# Patient Record
Sex: MALE | Race: WHITE | Employment: OTHER | URBAN - METROPOLITAN AREA
[De-identification: names, ages, dates, MRNs, and addresses within clinical notes are randomized per-mention and may not be internally consistent; named-entity substitution may affect disease eponyms.]

---

## 2019-04-23 ENCOUNTER — PRE VISIT (OUTPATIENT)
Dept: UROLOGY | Facility: CLINIC | Age: 69
End: 2019-04-23

## 2019-04-23 DIAGNOSIS — Q62.39 CONGENITAL OBSTRUCTION OF URETEROPELVIC JUNCTION: Primary | ICD-10-CM

## 2019-04-23 NOTE — TELEPHONE ENCOUNTER
MEDICAL RECORDS REQUEST   [facility]  [address] [place] [address]  PHONE: [phone]  Fax: [phone]        FUTURE VISIT INFORMATION                                                   Mitch Orozco, : 1950 scheduled for future visit at [facility]    APPOINTMENT INFORMATION:    Date: 19 7:00AM    Provider:  Ronnie Drake MD     Reason for Visit/Diagnosis: Urinary Ureteral Obstruction    REFERRAL INFORMATION:    Referring provider:  Self    Specialty: N/A    Referring providers clinic:  N/A    Clinic contact number:  N/A    RECORDS REQUESTED FOR VISIT                                                     NOTES  STATUS/DETAILS   OFFICE NOTE from referring provider  no   OFFICE NOTE from other specialist  no   DISCHARGE SUMMARY from hospital  no   DISCHARGE REPORT from the ER  no   OPERATIVE REPORT  no   MEDICATION LIST  no       PRE-VISIT CHECKLIST      Record collection complete If no, please explain: Recs are in [place], not able to obtain. Spoke with Nurse, images and labs are ordered prior to appt.    Appointment appropriately scheduled           (right time/right provider) No   MyChart activation No   Questionnaire complete If no, please explain: IN PROCESS     Completed by: Bella Wen

## 2019-04-30 ENCOUNTER — DOCUMENTATION ONLY (OUTPATIENT)
Dept: CARE COORDINATION | Facility: CLINIC | Age: 69
End: 2019-04-30

## 2019-05-03 ENCOUNTER — OFFICE VISIT (OUTPATIENT)
Dept: FAMILY MEDICINE | Facility: CLINIC | Age: 69
End: 2019-05-03
Payer: COMMERCIAL

## 2019-05-03 VITALS
DIASTOLIC BLOOD PRESSURE: 82 MMHG | HEART RATE: 73 BPM | SYSTOLIC BLOOD PRESSURE: 158 MMHG | HEIGHT: 70 IN | OXYGEN SATURATION: 99 % | WEIGHT: 153 LBS | BODY MASS INDEX: 21.9 KG/M2

## 2019-05-03 DIAGNOSIS — N40.1 BENIGN PROSTATIC HYPERPLASIA WITH LOWER URINARY TRACT SYMPTOMS, SYMPTOM DETAILS UNSPECIFIED: Primary | ICD-10-CM

## 2019-05-03 DIAGNOSIS — F32.A DEPRESSION, UNSPECIFIED DEPRESSION TYPE: ICD-10-CM

## 2019-05-03 RX ORDER — FINASTERIDE 5 MG/1
5 TABLET, FILM COATED ORAL DAILY
Qty: 30 TABLET | Refills: 1 | Status: SHIPPED | OUTPATIENT
Start: 2019-05-03 | End: 2020-03-12

## 2019-05-03 RX ORDER — PAROXETINE HYDROCHLORIDE HEMIHYDRATE 25 MG/1
25 TABLET, FILM COATED, EXTENDED RELEASE ORAL EVERY MORNING
COMMUNITY
End: 2019-05-03

## 2019-05-03 RX ORDER — DOXAZOSIN 8 MG/1
8 TABLET ORAL AT BEDTIME
Qty: 30 TABLET | Refills: 3 | Status: SHIPPED | OUTPATIENT
Start: 2019-05-03 | End: 2019-08-27

## 2019-05-03 RX ORDER — LIDOCAINE HYDROCHLORIDE 20 MG/ML
JELLY TOPICAL PRN
Qty: 30 ML | Refills: 0 | Status: SHIPPED | OUTPATIENT
Start: 2019-05-03 | End: 2019-05-29

## 2019-05-03 RX ORDER — PAROXETINE HYDROCHLORIDE HEMIHYDRATE 25 MG/1
25 TABLET, FILM COATED, EXTENDED RELEASE ORAL EVERY MORNING
Qty: 30 TABLET | Refills: 1 | Status: SHIPPED | OUTPATIENT
Start: 2019-05-03 | End: 2019-05-31

## 2019-05-03 RX ORDER — DUTASTERIDE 0.5 MG/1
0.5 CAPSULE, LIQUID FILLED ORAL DAILY
COMMUNITY
End: 2019-05-03

## 2019-05-03 RX ORDER — DOXAZOSIN 8 MG/1
8 TABLET ORAL AT BEDTIME
COMMUNITY
End: 2019-05-03

## 2019-05-03 ASSESSMENT — ENCOUNTER SYMPTOMS
SLEEP DISTURBANCE: 0
DIZZINESS: 0
FEVER: 0
ABDOMINAL PAIN: 0
FATIGUE: 0
DYSPHORIC MOOD: 0
WEAKNESS: 0
DYSURIA: 0
DIFFICULTY URINATING: 1

## 2019-05-03 ASSESSMENT — MIFFLIN-ST. JEOR: SCORE: 1469.63

## 2019-05-03 NOTE — PROGRESS NOTES
Preceptor Attestation:   Patient seen, evaluated and discussed with the resident. I have verified the content of the note, which accurately reflects my assessment of the patient and the plan of care.   Supervising Physician:  Tiago Mancilla MD

## 2019-05-03 NOTE — PROGRESS NOTES
HPI       Mitch Orozco is a 68 year old  who presents for   Chief Complaint   Patient presents with     Urinary Problem     needs catheters     Patient is here to establish care. Patient has BPH, and has been self-cathing since February. Has had BPH for 20 years. Has been living in [place] for past 10 years. Was diagnosed with hydronephrosis from obstructive causes.  Patient has appointment with urology at the end of the month but is been around catheters before that.  Patient has been on dutasteride the past few months but he would like to switch back to finasteride which is what he was on prior to going to [facility].  Patient is also on doxazosin for his BPH.  Patient has been considering getting a TURP procedure and is weighing the risks and benefits with his wife and brother who is a family medicine physician    He also has history of depression, doing well on Paxil.     Patient reports that he is feeling well today.  Denies any chest pain or shortness of breath.  Patient denies any dysuria.  Patient is catheterizing himself 5 times per day.  Patient reports that sometimes he does get some discolored at the tip of his penis and that Xylocaine gel is helpful in relieving the discomfort.         +++++++      Problem, Medication and Allergy Lists were reviewed and updated if needed..    Patient is an established patient of this clinic..         Review of Systems:   Review of Systems   Constitutional: Negative for fatigue and fever.   HENT: Negative for congestion.    Cardiovascular: Negative for chest pain.   Gastrointestinal: Negative for abdominal pain.   Endocrine: Negative for polyuria.   Genitourinary: Positive for difficulty urinating and penile pain. Negative for dysuria and penile swelling.   Allergic/Immunologic: Negative for immunocompromised state.   Neurological: Negative for dizziness, syncope and weakness.   Psychiatric/Behavioral: Negative for dysphoric mood and sleep disturbance.   All other  "systems reviewed and are negative.           Physical Exam:     Vitals:    05/03/19 0859   BP: 151/71   Pulse: 73   SpO2: 99%   Weight: 69.4 kg (153 lb)   Height: 1.785 m (5' 10.28\")     Body mass index is 21.78 kg/m .  Vitals were reviewed and were normal     Physical Exam  General- No acute distress, well-appearing  Skin- warm, no obvious skin lesions  Neuro- AOX3, CN 2-12 grossly intact  Cardio- RRR, no murmurs  Pulmonary- CTA in all fields, no wheezes or rhales  Psych- appropriate mood and affect    Results:   No testing ordered today    Assessment and Plan        Mitch was seen today for urinary problem.    Diagnoses and all orders for this visit:    Patient is here to establish care.  As severe BPH and is currently following up with urology at the end of the month but was in need of refills for his catheters and medications.  We discussed patient's health maintenance and I have updated his health maintenance section his medical record.      Benign prostatic hyperplasia with lower urinary tract symptoms, symptom details unspecified  -     lidocaine (XYLOCAINE) 2 % external gel; Place into the urethra as needed for moderate pain  -     doxazosin (CARDURA) 8 MG tablet; Take 1 tablet (8 mg) by mouth At Bedtime  -     finasteride (PROSCAR) 5 MG tablet; Take 1 tablet (5 mg) by mouth daily  -     order for DME; Equipment being ordered:     Straight Urinary Catheter, any brand or type OK    Diagnosis    Depression, unspecified depression type  -     PARoxetine (PAXIL-CR) 25 MG 24 hr tablet; Take 1 tablet (25 mg) by mouth every morning           There are no discontinued medications.    Options for treatment and follow-up care were reviewed with the patient. Mitch Orozco  engaged in the decision making process and verbalized understanding of the options discussed and agreed with the final plan.    Javier Burr, DO  "

## 2019-05-13 ENCOUNTER — TELEPHONE (OUTPATIENT)
Dept: FAMILY MEDICINE | Facility: CLINIC | Age: 69
End: 2019-05-13

## 2019-05-13 DIAGNOSIS — F32.A DEPRESSION, UNSPECIFIED DEPRESSION TYPE: Primary | ICD-10-CM

## 2019-05-13 NOTE — TELEPHONE ENCOUNTER
[facility] phone call message- medication clarification/question:    Full Medication Name: PARoxetine (PAXIL-CR) 25 MG 24 hr tablet   Dose: Take 1 tablet (25 mg) by mouth every morning - Oral    Question/Clarification needed: Patient calling to advise that paroxetine ER not covered by insurance; inquiring if provider could prescribe generic instead. Patient would prefer generic over PA on name brand.     Pharmacy confirmed as     [facility] [id number] [place] [address] & [address] [address]  Phone: [phone] Fax: [phone]  : Yes    Please leave ONLY preferred pharmacy    OK to leave a message on voice mail? Yes    Advised patient that RN would call back within 3 hours, unless emergent.    Primary language: English      needed? No    Call taken on May 13, 2019 at 1:13 PM by Bella Ledesma    Route to [facility]

## 2019-05-16 ENCOUNTER — DOCUMENTATION ONLY (OUTPATIENT)
Dept: FAMILY MEDICINE | Facility: CLINIC | Age: 69
End: 2019-05-16

## 2019-05-16 NOTE — PROGRESS NOTES
"When opening a documentation only encounter, be sure to enter in \"Chief Complaint\" Forms and in \" Comments\" Title of form, description if needed.    Mitch is a 68 year old  male  Form received via: Fax  Form now resides in: PCS Pending      Form has been completed by provider.     Form sent out via: Fax to Handi at Fax Number: [phone]  Patient informed: na  Output date: May 16, 2019    Nemo Knowles CMA      **Please close the encounter**      "

## 2019-05-17 NOTE — TELEPHONE ENCOUNTER
"Patient came to clinic to discuss about  medication \"Paxil ER\" per pharmacist \"there is no generic in the market and patient paid increased copay, out of pocket, patient called and then here today to discuss alternative affordable form of the medication, message routed to PCP to advise if appropriative.    Leslee Payne RN    "

## 2019-05-20 ENCOUNTER — DOCUMENTATION ONLY (OUTPATIENT)
Dept: FAMILY MEDICINE | Facility: CLINIC | Age: 69
End: 2019-05-20

## 2019-05-20 NOTE — PROGRESS NOTES
"When opening a documentation only encounter, be sure to enter in \"Chief Complaint\" Forms and in \" Comments\" Title of form, description if needed.    Mitch is a 68 year old  male  Form received via: Fax  Form now resides in: Provider Ready    Nemo Knowles CMA     Form has been completed by provider.     Form sent out via: Fax to Handi at Fax Number: [phone]  Patient informed: na  Output date: May 23, 2019    Nemo Knowles CMA      **Please close the encounter**                      "

## 2019-05-22 ENCOUNTER — MEDICAL CORRESPONDENCE (OUTPATIENT)
Dept: HEALTH INFORMATION MANAGEMENT | Facility: CLINIC | Age: 69
End: 2019-05-22

## 2019-05-22 ENCOUNTER — PRE VISIT (OUTPATIENT)
Dept: UROLOGY | Facility: CLINIC | Age: 69
End: 2019-05-22

## 2019-05-22 RX ORDER — PAROXETINE 30 MG/1
30 TABLET, FILM COATED ORAL EVERY MORNING
Qty: 30 TABLET | Refills: 3 | Status: SHIPPED | OUTPATIENT
Start: 2019-05-22 | End: 2019-06-25

## 2019-05-22 NOTE — TELEPHONE ENCOUNTER
RN contacted patient and he is happy to use the short acting. He states he does not need a return call with the new dosage, he trusts that MD will choose the best one for him. Please send new Rx to pharmacy. RN teed up pharmacy.  Alana Lewis RN

## 2019-05-22 NOTE — TELEPHONE ENCOUNTER
New patient consult for UPJ obstruction.  Patient to bring records.  Imaging prior to appointment.

## 2019-05-22 NOTE — TELEPHONE ENCOUNTER
Please call patient and ask if he is ok with short acting Paxil. It is generic and quite affordable. If it what [place] usually [place]. I can call to pharmacy if he wants this    Javier Burr D.O.  Family Medicine   p-[phone]

## 2019-05-28 ASSESSMENT — ENCOUNTER SYMPTOMS
DIFFICULTY URINATING: 1
FLANK PAIN: 0
HEMATURIA: 0
DYSURIA: 0

## 2019-05-29 DIAGNOSIS — N40.1 BENIGN PROSTATIC HYPERPLASIA WITH LOWER URINARY TRACT SYMPTOMS, SYMPTOM DETAILS UNSPECIFIED: ICD-10-CM

## 2019-05-29 RX ORDER — LIDOCAINE HYDROCHLORIDE 20 MG/ML
JELLY TOPICAL PRN
Qty: 30 ML | Refills: 0 | Status: SHIPPED | OUTPATIENT
Start: 2019-05-29 | End: 2019-08-30

## 2019-05-29 NOTE — TELEPHONE ENCOUNTER

## 2019-05-30 ENCOUNTER — ANCILLARY PROCEDURE (OUTPATIENT)
Dept: ULTRASOUND IMAGING | Facility: CLINIC | Age: 69
End: 2019-05-30
Attending: UROLOGY
Payer: COMMERCIAL

## 2019-05-30 DIAGNOSIS — Q62.39 CONGENITAL OBSTRUCTION OF URETEROPELVIC JUNCTION: ICD-10-CM

## 2019-05-30 LAB
ALBUMIN SERPL-MCNC: 3.7 G/DL (ref 3.4–5)
ALP SERPL-CCNC: 121 U/L (ref 40–150)
ALT SERPL W P-5'-P-CCNC: 20 U/L (ref 0–70)
ANION GAP SERPL CALCULATED.3IONS-SCNC: 6 MMOL/L (ref 3–14)
AST SERPL W P-5'-P-CCNC: 14 U/L (ref 0–45)
BASOPHILS # BLD AUTO: 0.1 10E9/L (ref 0–0.2)
BASOPHILS NFR BLD AUTO: 0.7 %
BILIRUB SERPL-MCNC: 0.4 MG/DL (ref 0.2–1.3)
BUN SERPL-MCNC: 37 MG/DL (ref 7–30)
CALCIUM SERPL-MCNC: 8.8 MG/DL (ref 8.5–10.1)
CHLORIDE SERPL-SCNC: 108 MMOL/L (ref 94–109)
CO2 SERPL-SCNC: 26 MMOL/L (ref 20–32)
CREAT SERPL-MCNC: 2.2 MG/DL (ref 0.66–1.25)
CRP SERPL-MCNC: <2.9 MG/L (ref 0–8)
DIFFERENTIAL METHOD BLD: ABNORMAL
EOSINOPHIL # BLD AUTO: 0.2 10E9/L (ref 0–0.7)
EOSINOPHIL NFR BLD AUTO: 3.5 %
ERYTHROCYTE [DISTWIDTH] IN BLOOD BY AUTOMATED COUNT: 13.2 % (ref 10–15)
GFR SERPL CREATININE-BSD FRML MDRD: 29 ML/MIN/{1.73_M2}
GLUCOSE SERPL-MCNC: 94 MG/DL (ref 70–99)
HCT VFR BLD AUTO: 40.1 % (ref 40–53)
HGB BLD-MCNC: 12.7 G/DL (ref 13.3–17.7)
IMM GRANULOCYTES # BLD: 0 10E9/L (ref 0–0.4)
IMM GRANULOCYTES NFR BLD: 0.3 %
LDH SERPL L TO P-CCNC: 130 U/L (ref 85–227)
LYMPHOCYTES # BLD AUTO: 2.4 10E9/L (ref 0.8–5.3)
LYMPHOCYTES NFR BLD AUTO: 34.8 %
MCH RBC QN AUTO: 30.2 PG (ref 26.5–33)
MCHC RBC AUTO-ENTMCNC: 31.7 G/DL (ref 31.5–36.5)
MCV RBC AUTO: 95 FL (ref 78–100)
MONOCYTES # BLD AUTO: 0.5 10E9/L (ref 0–1.3)
MONOCYTES NFR BLD AUTO: 7.8 %
NEUTROPHILS # BLD AUTO: 3.6 10E9/L (ref 1.6–8.3)
NEUTROPHILS NFR BLD AUTO: 52.9 %
NRBC # BLD AUTO: 0 10*3/UL
NRBC BLD AUTO-RTO: 0 /100
PLATELET # BLD AUTO: 200 10E9/L (ref 150–450)
POTASSIUM SERPL-SCNC: 4.4 MMOL/L (ref 3.4–5.3)
PROT SERPL-MCNC: 7.2 G/DL (ref 6.8–8.8)
RBC # BLD AUTO: 4.21 10E12/L (ref 4.4–5.9)
SODIUM SERPL-SCNC: 140 MMOL/L (ref 133–144)
WBC # BLD AUTO: 6.9 10E9/L (ref 4–11)

## 2019-05-30 NOTE — PROGRESS NOTES
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 Diagnosed with hydronephrosis from obstructive causes while in [facility] 2018    GFR was 29 from 2019  Creatinine   Date Value Ref Range Status   2019 2.20 (H) 0.66 - 1.25 mg/dL Final     We discussed UDS testing and described the procedure. I recommended this prior to any surgical interventions such as a TURP.     Large prostate on MAXIM, but smooth.     3. Multiple simple appearing right-sided renal cysts.    --Refill of finasteride   --Schedule UDS and then follow up   --Large prostate on MAXIM, otherwise normal  ---------------------------------------------------------------------------------------------------------------------    Chief Complaint:   New patient consult for UPJ obstruction. The patient's previous records are in [place], which the patient brings with him today.     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 Mr Orozco has a history of BPH (diagnosed 20 years ago) and had been self cathing since 2019. Has been living in [place] for past 10 years.     He was diagnosed with hydronephrosis from obstructive causes while in [place] 2018. He had a Hollis catheter placed with 2.6 liters of urine due to urine retention. He transitioned to self catheterization and has been doing this since 2019. He notes he cannot urinate on his own. Patient is catheterizing himself 5 times per day.     He has been on dutasteride the past few months but he would like to switch back to finasteride which is what he was on prior to going to [facility].  Patient is also on doxazosin for his BPH.  Patient has been considering getting a TURP procedure and is weighing the risks and benefits with his  "wife and brother who is a family medicine physician     Patient reports that sometimes he does get some discolored at the tip of his penis and that Xylocaine gel is helpful in relieving the discomfort.    The patient denies a family history of prostate cancer. The patient denies hematuria or dysuria.     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      Review of Systems:   Pertinent items are noted in HPI or as below, remainder of complete ROS is negative.      Active Medications:      doxazosin (CARDURA) 8 MG tablet, Take 1 tablet (8 mg) by mouth At Bedtime, Disp: 30 tablet, Rfl: 3     finasteride (PROSCAR) 5 MG tablet, Take 1 tablet (5 mg) by mouth daily, Disp: 30 tablet, Rfl: 1     lidocaine (XYLOCAINE) 2 % external gel, Place into the urethra as needed for moderate pain, Disp: 30 mL, Rfl: 0     lidocaine VISCOUS (XYLOCAINE) 2 % solution, Swish and spit 15 mLs in mouth every 3 hours as needed for moderate pain ; Max 8 doses/24 hour period., Disp: , Rfl:      order for DME, Equipment being ordered:   Straight Urinary Catheter, any brand or type OK  Diagnosis, Disp: 180 Units, Rfl: 3     PARoxetine (PAXIL) 30 MG tablet, Take 1 tablet (30 mg) by mouth every morning, Disp: 30 tablet, Rfl: 3     PARoxetine (PAXIL-CR) 25 MG 24 hr tablet, Take 1 tablet (25 mg) by mouth every morning, Disp: 30 tablet, Rfl: 1      Allergies:   The patient reports no known allergies.     Past Medical History:  BPH  Depression     Past Surgical History:  The patient does not have any pertinent past surgical history.    Family History:   Coronary artery disease; grandmother      Social History:   The patient denies smoking   The patient denies drug use     Physical Exam:   PHYSICAL EXAM  /60   Pulse 70   Ht 1.778 m (5' 10\")   Wt 69.4 kg (153 lb)   BMI 21.95 kg/m    Constitutional: Alert, no acute distress  Psychiatric: Normal mood and affect  Head: Normocephalic. No masses, lesions, tenderness " or abnormalities  Neck: Neck supple. No adenopathy. Thyroid symmetric, normal size  Back: No spinal tenderness.  No costovertebral angle   Gastrointestinal: Abdomen soft, non-tender. No masses, organomegaly. No hernia  Skin: no suspicious lesions or rashes on abdomen  Extremities: No lower extremity edema.   Neurologic:  Cranial nerves grossly intact.  Equal strength and sensation on bilateral extremities.   : Deferred  Rectal: MAXIM, prostate is smooth regular though large.     Imaging:   I have personally reviewed the results of the below imaging studies. The results were discussed with the patient.     Results for orders placed or performed in visit on 05/30/19   US Renal Complete    Narrative    EXAMINATION: US RENAL COMPLETE, 5/30/2019 7:23 AM     COMPARISON: None.    HISTORY: Evaluate for UPJ obstruction.    FINDINGS:    Right kidney: Measures 10.1 cm in length. Slight parenchymal atrophy  with normal echogenicity. No hydronephrosis. Multiple simple cysts  including a 3.5 cm exophytic cyst arising off the inferior pole 3.5 x  3.5 x 2.9 cm, 2.7 cm peripelvic cyst, and a 2.7 cm exophytic cyst  arising off the superior pole. No solid renal mass.    Left kidney: Measures 9.4 cm in length. Slight parenchymal atrophy  with normal echogenicity. No focal mass. No hydronephrosis.     Bladder: Circumferential bladder wall thickening with trabeculation.  Nonspecific echogenic debris.    The prostate is enlarged measuring 5.5 x 5.3 x 5.9 cm (approximately  91 grams).      Impression    IMPRESSION:  1. No hydronephrosis.  2. Multiple simple appearing right-sided renal cysts. No solid mass.  3. Prostatomegaly with circumferential bladder wall thickening and  trabeculation, probably sequelae of chronic bladder outlet narrowing.    I have personally reviewed the examination and initial interpretation  and I agree with the findings.    JEAN-PIERRE FATIMA MD     Laboratory:   I reviewed all applicable laboratory and pathology data  and went over findings with patient  Significant for     Lab Results   Component Value Date    CR 2.20 05/30/2019     CBC RESULTS:  Recent Labs   Lab Test 05/30/19  0634   WBC 6.9   HGB 12.7*        BMP RESULTS:  Recent Labs   Lab Test 05/30/19  0634      POTASSIUM 4.4   CHLORIDE 108   CO2 26   ANIONGAP 6   GLC 94   BUN 37*   CR 2.20*   GFRESTIMATED 29*   GFRESTBLACK 34*   FÁTIMA 8.8     Scribe Disclosure:  I, Augustus Irwin, am serving as a scribe to document services personally performed by Ronnie Drake MD at this visit, based upon the provider's statements to me. All documentation has been reviewed by the aforementioned provider prior to being entered into the official medical record.     Augustus Irwin served as the scribe for this patient's visit and documented my history and physical exam.  I performed the history and physical exam.  I have edited and agree with the note.  AMELIA Drake MD        CC  Patient Care Team:  Javier Burr DO as PCP - General (Student in organized health care education/training program)  SELF, REFERRED    Copy to patient  MAYKEL FLEMINGTEODORO  [address] [facility] [address]    Answers for HPI/ROS submitted by the patient on 5/28/2019   General Symptoms: No  Skin Symptoms: No  HENT Symptoms: No  EYE SYMPTOMS: No  HEART SYMPTOMS: No  LUNG SYMPTOMS: No  INTESTINAL SYMPTOMS: No  URINARY SYMPTOMS: Yes  REPRODUCTIVE SYMPTOMS: No  SKELETAL SYMPTOMS: No  BLOOD SYMPTOMS: No  NERVOUS SYSTEM SYMPTOMS: No  MENTAL HEALTH SYMPTOMS: No  Trouble holding urine or incontinence: No  Pain or burning: No  Trouble starting or stopping: Yes  Increased frequency of urination: No  Blood in urine: No  Decreased frequency of urination: Yes  Frequent nighttime urination: No  Flank pain: No  Difficulty emptying bladder: Yes

## 2019-05-31 ENCOUNTER — OFFICE VISIT (OUTPATIENT)
Dept: UROLOGY | Facility: CLINIC | Age: 69
End: 2019-05-31
Payer: COMMERCIAL

## 2019-05-31 ENCOUNTER — APPOINTMENT (OUTPATIENT)
Dept: LAB | Facility: CLINIC | Age: 69
End: 2019-05-31
Payer: COMMERCIAL

## 2019-05-31 VITALS
WEIGHT: 153 LBS | DIASTOLIC BLOOD PRESSURE: 60 MMHG | SYSTOLIC BLOOD PRESSURE: 106 MMHG | HEIGHT: 70 IN | HEART RATE: 70 BPM | BODY MASS INDEX: 21.9 KG/M2

## 2019-05-31 DIAGNOSIS — R33.9 URINARY RETENTION: Primary | ICD-10-CM

## 2019-05-31 LAB — PSA SERPL-MCNC: 2.52 UG/L (ref 0–4)

## 2019-05-31 RX ORDER — FINASTERIDE 5 MG/1
5 TABLET, FILM COATED ORAL DAILY
Qty: 90 TABLET | Refills: 3 | Status: SHIPPED | OUTPATIENT
Start: 2019-05-31 | End: 2019-08-30

## 2019-05-31 ASSESSMENT — PAIN SCALES - GENERAL: PAINLEVEL: NO PAIN (0)

## 2019-05-31 ASSESSMENT — MIFFLIN-ST. JEOR: SCORE: 1465.25

## 2019-05-31 NOTE — PATIENT INSTRUCTIONS
Medication refill has been sent to your pharmacy. (Finasteride)    PSA today.    Schedule appointment for Urodynamics Testing, and follow up with Dr. Drake to discuss the results and further treatment options.    It was a pleasure meeting with you today.  Thank you for allowing me and my team the privilege of caring for you today.  YOU are the reason we are here, and I truly hope we provided you with the excellent service you deserve.  Please let us know if there is anything else we can do for you so that we can be sure you are leaving completely satisfied with your care experience.        CHEMA Kilpatrick

## 2019-05-31 NOTE — LETTER
2019       RE: Mitch Orozco   [address]  [facility] [address]     Dear Colleague,    Thank you for referring your patient, Mitch Orozco, to the [facility] UROLOGY AND INST FOR PROSTATE AND UROLOGIC CANCERS at [facility]. Please see a copy of my visit note below.      Urology Clinic    Ronnie Drake MD  Date of Service: 2019     Name: Mitch Orozco  MRN: [id number]  Age: 68 year old  : 1950  Referring provider: Referred Self     Assessment and Plan:  1. 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 Diagnosed with hydronephrosis from obstructive causes while in [place] 2018    GFR was 29 from 2019  Creatinine   Date Value Ref Range Status   2019 2.20 (H) 0.66 - 1.25 mg/dL Final     We discussed UDS testing and described the procedure. I recommended this prior to any surgical interventions such as a TURP.     Large prostate on MAXIM, but smooth.     3. Multiple simple appearing right-sided renal cysts.    --Refill of finasteride   --Schedule UDS and then follow up   --Large prostate on MAXIM, otherwise normal  ---------------------------------------------------------------------------------------------------------------------    Chief Complaint:   New patient consult for UPJ obstruction. The patient's previous records are in [place], which the patient brings with him today.     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 Mr Orozco has a history of BPH (diagnosed 20 years ago) and had been self cathing since 2019. Has been living in [place] for past 10 years.     He was diagnosed with hydronephrosis from obstructive causes while in [place] 2018. He had a Hollis catheter placed with 2.6 liters of urine due to urine retention. He transitioned to self catheterization and has been doing this since 2019. He notes he cannot urinate on his own. Patient is catheterizing  himself 5 times per day.     He has been on dutasteride the past few months but he would like to switch back to finasteride which is what he was on prior to going to [facility].  Patient is also on doxazosin for his BPH.  Patient has been considering getting a TURP procedure and is weighing the risks and benefits with his wife and brother who is a family medicine physician     Patient reports that sometimes he does get some discolored at the tip of his penis and that Xylocaine gel is helpful in relieving the discomfort.    The patient denies a family history of prostate cancer. The patient denies hematuria or dysuria.     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      Review of Systems:   Pertinent items are noted in HPI or as below, remainder of complete ROS is negative.      Active Medications:      doxazosin (CARDURA) 8 MG tablet, Take 1 tablet (8 mg) by mouth At Bedtime, Disp: 30 tablet, Rfl: 3     finasteride (PROSCAR) 5 MG tablet, Take 1 tablet (5 mg) by mouth daily, Disp: 30 tablet, Rfl: 1     lidocaine (XYLOCAINE) 2 % external gel, Place into the urethra as needed for moderate pain, Disp: 30 mL, Rfl: 0     lidocaine VISCOUS (XYLOCAINE) 2 % solution, Swish and spit 15 mLs in mouth every 3 hours as needed for moderate pain ; Max 8 doses/24 hour period., Disp: , Rfl:      order for DME, Equipment being ordered:   Straight Urinary Catheter, any brand or type OK  Diagnosis, Disp: 180 Units, Rfl: 3     PARoxetine (PAXIL) 30 MG tablet, Take 1 tablet (30 mg) by mouth every morning, Disp: 30 tablet, Rfl: 3     PARoxetine (PAXIL-CR) 25 MG 24 hr tablet, Take 1 tablet (25 mg) by mouth every morning, Disp: 30 tablet, Rfl: 1      Allergies:   The patient reports no known allergies.     Past Medical History:  BPH  Depression     Past Surgical History:  The patient does not have any pertinent past surgical history.    Family History:   Coronary artery disease; grandmother      Social  "History:   The patient denies smoking   The patient denies drug use     Physical Exam:   PHYSICAL EXAM  /60   Pulse 70   Ht 1.778 m (5' 10\")   Wt 69.4 kg (153 lb)   BMI 21.95 kg/m     Constitutional: Alert, no acute distress  Psychiatric: Normal mood and affect  Head: Normocephalic. No masses, lesions, tenderness or abnormalities  Neck: Neck supple. No adenopathy. Thyroid symmetric, normal size  Back: No spinal tenderness.  No costovertebral angle   Gastrointestinal: Abdomen soft, non-tender. No masses, organomegaly. No hernia  Skin: no suspicious lesions or rashes on abdomen  Extremities: No lower extremity edema.   Neurologic:  Cranial nerves grossly intact.  Equal strength and sensation on bilateral extremities.   : Deferred  Rectal: MAXIM, prostate is smooth regular though large.     Imaging:   I have personally reviewed the results of the below imaging studies. The results were discussed with the patient.     Results for orders placed or performed in visit on 05/30/19   US Renal Complete    Narrative    EXAMINATION: US RENAL COMPLETE, 5/30/2019 7:23 AM     COMPARISON: None.    HISTORY: Evaluate for UPJ obstruction.    FINDINGS:    Right kidney: Measures 10.1 cm in length. Slight parenchymal atrophy  with normal echogenicity. No hydronephrosis. Multiple simple cysts  including a 3.5 cm exophytic cyst arising off the inferior pole 3.5 x  3.5 x 2.9 cm, 2.7 cm peripelvic cyst, and a 2.7 cm exophytic cyst  arising off the superior pole. No solid renal mass.    Left kidney: Measures 9.4 cm in length. Slight parenchymal atrophy  with normal echogenicity. No focal mass. No hydronephrosis.     Bladder: Circumferential bladder wall thickening with trabeculation.  Nonspecific echogenic debris.    The prostate is enlarged measuring 5.5 x 5.3 x 5.9 cm (approximately  91 grams).      Impression    IMPRESSION:  1. No hydronephrosis.  2. Multiple simple appearing right-sided renal cysts. No solid mass.  3. " Prostatomegaly with circumferential bladder wall thickening and  trabeculation, probably sequelae of chronic bladder outlet narrowing.    I have personally reviewed the examination and initial interpretation  and I agree with the findings.    JEAN-PIERRE FATIMA MD     Laboratory:   I reviewed all applicable laboratory and pathology data and went over findings with patient  Significant for     Lab Results   Component Value Date    CR 2.20 05/30/2019     CBC RESULTS:  Recent Labs   Lab Test 05/30/19  0634   WBC 6.9   HGB 12.7*        BMP RESULTS:  Recent Labs   Lab Test 05/30/19  0634      POTASSIUM 4.4   CHLORIDE 108   CO2 26   ANIONGAP 6   GLC 94   BUN 37*   CR 2.20*   GFRESTIMATED 29*   GFRESTBLACK 34*   FÁTIMA 8.8     Scribe Disclosure:  I, Augustus Irwin, am serving as a scribe to document services personally performed by Ronnie Drake MD at this visit, based upon the provider's statements to me. All documentation has been reviewed by the aforementioned provider prior to being entered into the official medical record.     Augustus Irwin served as the scribe for this patient's visit and documented my history and physical exam.  I performed the history and physical exam.  I have edited and agree with the note.  AMELIA Drake MD        CC  Patient Care Team:  Javier Burr DO as PCP - General (Student in organized health care education/training program)  SELF, REFERRED    Copy to patient  MAYKEL GIBBS  [address] [facility] [address]    Answers for HPI/ROS submitted by the patient on 5/28/2019   General Symptoms: No  Skin Symptoms: No  HENT Symptoms: No  EYE SYMPTOMS: No  HEART SYMPTOMS: No  LUNG SYMPTOMS: No  INTESTINAL SYMPTOMS: No  URINARY SYMPTOMS: Yes  REPRODUCTIVE SYMPTOMS: No  SKELETAL SYMPTOMS: No  BLOOD SYMPTOMS: No  NERVOUS SYSTEM SYMPTOMS: No  MENTAL HEALTH SYMPTOMS: No  Trouble holding urine or incontinence: No  Pain or burning: No  Trouble starting or  stopping: Yes  Increased frequency of urination: No  Blood in urine: No  Decreased frequency of urination: Yes  Frequent nighttime urination: No  Flank pain: No  Difficulty emptying bladder: Yes      Again, thank you for allowing me to participate in the care of your patient.      Sincerely,    Ronnie Drake MD

## 2019-05-31 NOTE — NURSING NOTE
Chief Complaint   Patient presents with     Consult     New patient consult for UPJ obstruction     Christin Mejia, A

## 2019-06-24 DIAGNOSIS — F32.A DEPRESSION, UNSPECIFIED DEPRESSION TYPE: ICD-10-CM

## 2019-06-24 NOTE — TELEPHONE ENCOUNTER
"Request for medication refill:PARoxetine (PAXIL) 30 MG tablet    Providers if patient needs an appointment and you are willing to give a one month supply please refill for one month and  send a letter/MyChart using \".SMILLIMITEDREFILL\" .smillimited and route chart to \"[facility] \" (Giving one month refill in non controlled medications is strongly recommended before denial)    If refill has been denied, meaning absolutely no refills without visit, please complete the smart phrase \".smirxrefuse\" and route it to the \"[facility] MED REFILLS\"  pool to inform the patient and the pharmacy.    Law Na, MA        "

## 2019-06-25 ENCOUNTER — OFFICE VISIT (OUTPATIENT)
Dept: FAMILY MEDICINE | Facility: CLINIC | Age: 69
End: 2019-06-25
Payer: COMMERCIAL

## 2019-06-25 VITALS
DIASTOLIC BLOOD PRESSURE: 63 MMHG | HEART RATE: 68 BPM | RESPIRATION RATE: 16 BRPM | TEMPERATURE: 97.7 F | BODY MASS INDEX: 21.76 KG/M2 | OXYGEN SATURATION: 97 % | WEIGHT: 152 LBS | HEIGHT: 70 IN | SYSTOLIC BLOOD PRESSURE: 113 MMHG

## 2019-06-25 DIAGNOSIS — N40.1 BENIGN PROSTATIC HYPERPLASIA WITH URINARY RETENTION: ICD-10-CM

## 2019-06-25 DIAGNOSIS — Z00.00 HEALTHCARE MAINTENANCE: Primary | ICD-10-CM

## 2019-06-25 DIAGNOSIS — R33.8 BENIGN PROSTATIC HYPERPLASIA WITH URINARY RETENTION: ICD-10-CM

## 2019-06-25 RX ORDER — PAROXETINE 30 MG/1
30 TABLET, FILM COATED ORAL EVERY MORNING
Qty: 30 TABLET | Refills: 3 | Status: SHIPPED | OUTPATIENT
Start: 2019-06-25 | End: 2019-11-14

## 2019-06-25 ASSESSMENT — MIFFLIN-ST. JEOR: SCORE: 1468.21

## 2019-06-25 NOTE — LETTER
To Whom It May concern,      Laurent Pam is a patient of mine. He is an excellent health for his age. He is approved to participate in [employer] diving without restriction.          Sincerely,    Javier Burr, DO

## 2019-06-25 NOTE — PROGRESS NOTES
>64 year old Wellness Visit ( Medicare etc)         HPI       This 68 year old male presents as an established patient  Javier Burr who presents for a  Annual Wellness Exam.    Other issues patient wants to address today:    yes, needs new catheter refills  Needs form for skydiving        Visual Acuity: :  Testing not done; patient has seen eye doctor in the past 12 months.    Patient Active Problem List   Diagnosis     Benign prostatic hyperplasia with urinary retention       Past Medical History:   Diagnosis Date     BPH (benign prostatic hyperplasia)      CKD (chronic kidney disease)      Depression         Family History   Problem Relation Age of Onset     Coronary Artery Disease Maternal Grandmother          Problem List, Family History and past Medical History reviewed and unchanged/updated.       Health risk Assessment/ Review of Systems:         Constitutional:   Fevers or night sweats?  NO      Eyes:   Vision problems?  NO            Hearing:  Do you feel you have hearing loss?  NO  Cardiovascular:  Chest pain, palpitations, or pain with walking?  NO        Respiratory:   Breathing problems or cough?  NO    :   Difficulty controlling urination?  YES        Musculoskeletal:   Stiffness or sore joints?  NO            Skin:   concerning lesions or moles?  NO           Nervous System:   loss of strength or sensation,  numbness or tingling,  tremor,  dizziness,  headache?  NO         Mental Health:   depression or anxiety,  sleep problems?  NO   Cognition:  Memory problems?  NO       Weight Loss: Have you lost 10 or more pounds unintentionally in the previous year?  NO  Energy: How much of the time during the past 3 weeks did you feel tired?   (check one of the following):   ?  All of the time  ? Most of the time  X Some of the time  ? A little of the time  ? None of the Time    PHQ-2 Score:   PHQ-2 ( 1999 Pf[employer]) 6/25/2019 5/3/2019   Q1: Little interest or pleasure in doing things 0 0   Q2: Feeling  down, depressed or hopeless 0 0   PHQ-2 Score 0 0       PHQ-9 Score:   No flowsheet data found.  Medical Care:     What other specialists or organizations are involved in your medical care?  Urology  Patient Care Team       Relationship Specialty Notifications Start End    Javier Burr, DO PCP - General Student in organized health care education/training program  5/3/19     Phone: [phone] Fax: [phone]         [facility] [address] [facility] [address]          Do you have an advanced directive? no      Social History / Home Safety     Social History     Tobacco Use     Smoking status: Never Smoker     Smokeless tobacco: Never Used   Substance Use Topics     Alcohol use: Not on file     Marital Status:  Who lives in your household? Wife, daughter and grandaughter  Does your home have any of the following safety concerns? Loose rugs in the hallway, no grab bars in the bathroom, no handrails on the stairs or have poorly lit areas?  No   Do you feel threatened or controlled by a partner, ex-partner or anyone in your life? {Yes No   Has anyone hurt you physically, for example by pushing, hitting, slapping or kicking you   or forcing you to have sex? No       Functional Status     Do you need help with dressing yourself, bathing, or walking?No   Do you need help with the phone, transportation, shopping, preparing meals, housework, laundry, medications or managing money?No   By yourself and not using aids, do you have any difficulty walking up 10 steps  without resting? No   By yourself and not using aids, do you have any difficulty walking several hundred yards? No              Risk Behaviors and Healthy Habits     History   Smoking Status     Never Smoker   Smokeless Tobacco     Never Used     How many servings of fruits and vegetables do you eat a day? 4-6  Exercise:walking  6-7 days/week for an average of 45-60 minutes  Do you frequently drive without a seatbelt? No   History   Smoking  "Status     Never Smoker   Smokeless Tobacco     Never Used     Do you use any other drugs? No       Do you use alcohol?No      Functional Ability   Was the patient's timed Up & Go test (Get up from chair walk, 10 feet turn, return to chair and sit down) unsteady or longer than 10 seconds? No     Fall risk:     1. Have you fallen two or more times in the past year? No   2. Have you fallen and had an injury in the past year?  No         Evaulation of Cognitive Function     Family member/caregiver input: Normal    1) Repeat 3 items (Leader, Season, Table)    2) Clock draw: NORMAL  3) 3 item recall: Recalls 3 objects  Results: 3 items recalled: COGNITIVE IMPAIRMENT LESS LIKELY    Mini-CogTM Copyright [employer]. Licensed by the author for use in [facility]; reprinted with permission (marcella@[facility]). All rights reserved.            Other Assessments:     Advance Directives: Discussed with patient and family as appropriate.  Has patient completed advance directives? No: Advance care planning was reviewed with patient; patient declined at this time.          Immunization History   Administered Date(s) Administered     Pneumo Conj 13-V (2010&after) 05/03/2017     TDAP Vaccine (Boostrix) 06/25/2019     Zoster vaccine recombinant adjuvanted (SHINGRIX) 05/03/2017     Reviewed Immunization Record Today    Physical Exam     Vitals: /63   Pulse 68   Temp 97.7  F (36.5  C) (Oral)   Resp 16   Ht 1.79 m (5' 10.47\")   Wt 68.9 kg (152 lb)   SpO2 97%   BMI 21.52 kg/m    BMI= Body mass index is 21.52 kg/m .  GENERAL APPEARANCE: alert and no distress  Hearing loss screen:   Abnormal - discussed age related hearing loss   DENTITION:  Good repair?  yes  SKIN: no suspicious lesions or rashes  NEURO: Normal strength and tone  MENTAL STATUS EXAM  Appearance: appropriate  Attitude: cooperative  Behavior: normal  Eye Contact: normal  Speech: normal  Orientation: oreinted to person , place, time and situation  Mood:  " good  Affect: Mood Congruent  Thought Process: clear        Assessment and Plan         Welcome to Medicare Preventive Visit / Initial Medicare Annual Wellness Exam - reviewed Preventive Services and Plan form with patient as specified in Patient Instructions.    1. Benign prostatic hyperplasia with urinary retention    Refilled 12F urinary catheters  Getting urodynamic study with urology to evaluate for  TURP    - order for DME; Equipment being ordered:     12 Upper sorbian intermittent straight catheter red vinyl  Dispense: 180 catheter; Refill: 3    2. Healthcare maintenance    - ADMIN VACCINE, INITIAL  - TDAP VACCINE (BOOSTRIX)      Options for treatment and follow-up care were reviewed with the Mitch Orozco and/or guardian engaged in the decision making process and verbalized understanding of the options discussed and agreed with the final plan.    Javier Burr, DO

## 2019-06-27 NOTE — PROGRESS NOTES
Preceptor Attestation:   Patient seen, evaluated and discussed with the resident. I have verified the content of the note, which accurately reflects my assessment of the patient and the plan of care.   Supervising Physician:  Taigo Mancilla MD

## 2019-07-03 ENCOUNTER — DOCUMENTATION ONLY (OUTPATIENT)
Dept: FAMILY MEDICINE | Facility: CLINIC | Age: 69
End: 2019-07-03

## 2019-07-03 NOTE — PROGRESS NOTES
"When opening a documentation only encounter, be sure to enter in \"Chief Complaint\" Forms and in \" Comments\" Title of form, description if needed.    Laurent is a 68 year old  male  Form received via: Fax  Form now resides in: Provider Ready    Earline Ying MA       Form has been completed by provider.     Form sent out via: Fax to Natalia Brunner @ [facility] at Fax Number: [phone]  Patient informed: N/A  Output date: July 3, 2019    Earline Ying MA                            "

## 2019-07-24 ENCOUNTER — PRE VISIT (OUTPATIENT)
Dept: UROLOGY | Facility: CLINIC | Age: 69
End: 2019-07-24

## 2019-08-09 ENCOUNTER — PRE VISIT (OUTPATIENT)
Dept: UROLOGY | Facility: CLINIC | Age: 69
End: 2019-08-09

## 2019-08-09 NOTE — TELEPHONE ENCOUNTER
Chief Complaint : UDS-Dr. Drake    Hx/Sx: UPJ obstruction     Records/Orders: Available     Pt Contacted: Letter on 8/9    At Rooming: susie MCCORMICK

## 2019-08-17 NOTE — PROGRESS NOTES
PREPROCEDURE DIAGNOSES:    1. BPH with urinary retention     POSTPROCEDURE DIAGNOSES:  -Normal bladder capacity (400 mL) with normal filling sensations.  -Good bladder compliance without DO/DOI.  -No JM.  -No appreciable detrusor contraction with attempt to void, resulting in complete urinary retention (RU: 400 mL)  -EMG quiet during attempt to void.  -Fluoroscopy reveals a moderately-trabeculated bladder wall with one diverticulum on the left lateral side.  No vesicoureteral reflux was observed.  The bladder neck was closed during filling and during voiding.     PROCEDURE:    1. Sterile urethral catheterization for measurement of postvoid residual urine volume.  2. Complex filling cystometrogram with measurement of bladder and rectal pressures.  3. Complex voiding cystometrogram with measurement of bladder and rectal pressures.  4. Electromyography of the pelvic floor during urodynamics.  5. Fluoroscopic imaging of the bladder during urodynamics, at least 3 views.    6. Interpretation of urodynamics and flouroscopic imaging.      INDICATIONS FOR PROCEDURE:  Mr. Mitch Orozco is a pleasant 69 year old male with BPH with urinary retention. Baseline video urodynamic assessment is requested today by Dr. Drake to better characterize Mr. Mitch Orozco's voiding dysfunction.      VOIDING DIARY:  Caths every 5 hours during the day.  Total Volume Intake: 1400 mL; rice milk, cranberry juice, water.  Total Volume Output: 1870 mL; average cathed volume 375 mL, largest cathed volume 510 mL.    DESCRIPTION OF PROCEDURE:  Risks, benefits, and alternatives to urodynamics were discussed with the patient and he wished to proceed.  Urodynamics are planned to better assess the primary etiology for Mr. Richardsons urologic dysfunction.  After informed consent was obtained, the patient was taken to the procedure room where the study was initiated. Findings below.     PRE-STUDY UROFLOWMETRY:  Uroflow not completed.  Postvoid  residual by catheter: 275 mL.  Pretest urine dipstick was positive for leukocytes and nitrites. The patient denies any UTI symptoms today; specifically, denies dysuria, frequency, urgency, hematuria, fevers, chills, N/V. In this patient who self-catheterizes multiple times per day, colonization is likely and therefore some degree of pyuria is to be expected. We discussed the risks vs benefits of proceeding with today's study. The patient verbalized understanding and wishes to proceed.     Next a 7F double-lumen urodynamics catheter was inserted into the bladder under sterile technique via the urethra.  A 7F abdominal manometry catheter was placed in the rectum.  EMG pads were placed on both sides of the anal verge.  The bladder was filled with 200 mL of Omnipaque at 40 mL/minute and serial pressures were recorded.  With coughing there was an appropriate rise in vesical and abdominal pressures with no change in detrusor pressure, confirming good study catheter placement.    DURING THE FILLING PHASE:  First sensation: 127 mL.  First Desire: 196 mL.  Strong Desire: 306 mL.  Maximum Capacity: 400 mL.    Uninhibited detrusor contractions: None.  Compliance: Good. PDet=0 cmH20 at capacity.  Continence: No DOI or JM  EMG: Concordant during filling.    DURING THE VOIDING PHASE:  Maximum detrusor contraction: No appreciable detrusor contraction with attempt to void.  Voided volume: 0 mL.  Residual urine: 400 mL.  EMG activity: Quiet.    FLUOROSCOPIC IMAGING OF THE BLADDER DURING URODYNAMICS:  Please note, image numbers on UDS tracings correlate with iSite series numbers on PACS images. Fluoroscopy during today's procedure demonstrated a moderately-trabeculated bladder wall with one diverticulum on the left lateral side.  No vesicoureteral reflux was observed.  The bladder neck was closed during filling and during voiding.  After voiding to completion, all catheters were removed and the patient was brought back into the  consultation room to further discuss today's study results.      ASSESSMENT/PLAN:  Mr. Mitch Orozco is a pleasant 69 year old male with BPH with urinary retention who demonstrated the following findings today on urodynamic evaluation:    -Normal bladder capacity (400 mL) with normal filling sensations.  -Good bladder compliance without DO/DOI.  -No JM.  -No appreciable detrusor contraction with attempt to void, resulting in complete urinary retention (RU: 400 mL)  -EMG quiet during attempt to void.  -Fluoroscopy reveals a moderately-trabeculated bladder wall with one diverticulum on the left lateral side.  No vesicoureteral reflux was observed.  The bladder neck was closed during filling and during voiding.     The patient will follow up as scheduled with Dr. Drake to further discuss today's study results and make plans for how best to proceed.      - A single Bactrim DS was provided for UTI prophylaxis following completion of today's study per department protocol.  The risk of UTI with VUDS is low at ~2.5-3%.      Thank you for allowing me to participate in the care of Mr. Mitch Orozco and please don't hesitate to contact me with any questions or concerns.      LAVONNE Hooks, CNP  Department of Urology

## 2019-08-19 ENCOUNTER — OFFICE VISIT (OUTPATIENT)
Dept: UROLOGY | Facility: CLINIC | Age: 69
End: 2019-08-19
Payer: COMMERCIAL

## 2019-08-19 ENCOUNTER — ANCILLARY PROCEDURE (OUTPATIENT)
Dept: RADIOLOGY | Facility: AMBULATORY SURGERY CENTER | Age: 69
End: 2019-08-19
Attending: NURSE PRACTITIONER
Payer: COMMERCIAL

## 2019-08-19 VITALS
WEIGHT: 151 LBS | SYSTOLIC BLOOD PRESSURE: 116 MMHG | HEART RATE: 74 BPM | DIASTOLIC BLOOD PRESSURE: 66 MMHG | HEIGHT: 70 IN | BODY MASS INDEX: 21.62 KG/M2

## 2019-08-19 DIAGNOSIS — R33.9 URINARY RETENTION: Primary | ICD-10-CM

## 2019-08-19 RX ORDER — SULFAMETHOXAZOLE/TRIMETHOPRIM 800-160 MG
1 TABLET ORAL ONCE
Status: COMPLETED | OUTPATIENT
Start: 2019-08-19 | End: 2019-08-19

## 2019-08-19 RX ADMIN — SULFAMETHOXAZOLE AND TRIMETHOPRIM 1 TABLET: 800; 160 TABLET ORAL at 07:10

## 2019-08-19 ASSESSMENT — MIFFLIN-ST. JEOR: SCORE: 1451.18

## 2019-08-19 ASSESSMENT — PAIN SCALES - GENERAL: PAINLEVEL: NO PAIN (0)

## 2019-08-19 NOTE — PATIENT INSTRUCTIONS
Please follow up with Dr. Drake as scheduled to review your UDS results.    It was a pleasure meeting with you today.  Thank you for allowing me and my team the privilege of caring for you today.  YOU are the reason we are here, and I truly hope we provided you with the excellent service you deserve.  Please let us know if there is anything else we can do for you so that we can be sure you are leaving completely satisfied with your care experience.      Marin Burr

## 2019-08-19 NOTE — LETTER
8/19/2019       RE: Mitch Orozco  [address]  [address]  [facility] [address]     Dear Colleague,    Thank you for referring your patient, Mitch Orozco, to the [facility] UROLOGY AND INST FOR PROSTATE AND UROLOGIC CANCERS at [facility]. Please see a copy of my visit note below.    PREPROCEDURE DIAGNOSES:    1. BPH with urinary retention     POSTPROCEDURE DIAGNOSES:  -Normal bladder capacity (400 mL) with normal filling sensations.  -Good bladder compliance without DO/DOI.  -No JM.  -No appreciable detrusor contraction with attempt to void, resulting in complete urinary retention (RU: 400 mL)  -EMG quiet during attempt to void.  -Fluoroscopy reveals a moderately-trabeculated bladder wall with one diverticulum on the left lateral side.  No vesicoureteral reflux was observed.  The bladder neck was closed during filling and during voiding.     PROCEDURE:    1. Sterile urethral catheterization for measurement of postvoid residual urine volume.  2. Complex filling cystometrogram with measurement of bladder and rectal pressures.  3. Complex voiding cystometrogram with measurement of bladder and rectal pressures.  4. Electromyography of the pelvic floor during urodynamics.  5. Fluoroscopic imaging of the bladder during urodynamics, at least 3 views.    6. Interpretation of urodynamics and flouroscopic imaging.      INDICATIONS FOR PROCEDURE:  Mr. Mitch Orozco is a pleasant 69 year old male with BPH with urinary retention. Baseline video urodynamic assessment is requested today by Dr. Drake to better characterize Mr. Mitch Orozco's voiding dysfunction.      VOIDING DIARY:  Caths every 5 hours during the day.  Total Volume Intake: 1400 mL; rice milk, cranberry juice, water.  Total Volume Output: 1870 mL; average cathed volume 375 mL, largest cathed volume 510 mL.    DESCRIPTION OF PROCEDURE:  Risks, benefits, and alternatives to urodynamics were discussed with the patient  and he wished to proceed.  Urodynamics are planned to better assess the primary etiology for Mr. Orozco's urologic dysfunction.  After informed consent was obtained, the patient was taken to the procedure room where the study was initiated. Findings below.     PRE-STUDY UROFLOWMETRY:  Uroflow not completed.  Postvoid residual by catheter: 275 mL.  Pretest urine dipstick was positive for leukocytes and nitrites. The patient denies any UTI symptoms today; specifically, denies dysuria, frequency, urgency, hematuria, fevers, chills, N/V. In this patient who self-catheterizes multiple times per day, colonization is likely and therefore some degree of pyuria is to be expected. We discussed the risks vs benefits of proceeding with today's study. The patient verbalized understanding and wishes to proceed.     Next a 7F double-lumen urodynamics catheter was inserted into the bladder under sterile technique via the urethra.  A 7F abdominal manometry catheter was placed in the rectum.  EMG pads were placed on both sides of the anal verge.  The bladder was filled with 200 mL of Omnipaque at 40 mL/minute and serial pressures were recorded.  With coughing there was an appropriate rise in vesical and abdominal pressures with no change in detrusor pressure, confirming good study catheter placement.    DURING THE FILLING PHASE:  First sensation: 127 mL.  First Desire: 196 mL.  Strong Desire: 306 mL.  Maximum Capacity: 400 mL.    Uninhibited detrusor contractions: None.  Compliance: Good. PDet=0 cmH20 at capacity.  Continence: No DOI or JM  EMG: Concordant during filling.    DURING THE VOIDING PHASE:  Maximum detrusor contraction: No appreciable detrusor contraction with attempt to void.  Voided volume: 0 mL.  Residual urine: 400 mL.  EMG activity: Quiet.    FLUOROSCOPIC IMAGING OF THE BLADDER DURING URODYNAMICS:  Please note, image numbers on UDS tracings correlate with iSite series numbers on PACS images. Fluoroscopy during  today's procedure demonstrated a moderately-trabeculated bladder wall with one diverticulum on the left lateral side.  No vesicoureteral reflux was observed.  The bladder neck was closed during filling and during voiding.  After voiding to completion, all catheters were removed and the patient was brought back into the consultation room to further discuss today's study results.      ASSESSMENT/PLAN:  Mr. Mitch Orozco is a pleasant 69 year old male with BPH with urinary retention who demonstrated the following findings today on urodynamic evaluation:    -Normal bladder capacity (400 mL) with normal filling sensations.  -Good bladder compliance without DO/DOI.  -No JM.  -No appreciable detrusor contraction with attempt to void, resulting in complete urinary retention (RU: 400 mL)  -EMG quiet during attempt to void.  -Fluoroscopy reveals a moderately-trabeculated bladder wall with one diverticulum on the left lateral side.  No vesicoureteral reflux was observed.  The bladder neck was closed during filling and during voiding.     The patient will follow up as scheduled with Dr. Drake to further discuss today's study results and make plans for how best to proceed.      - A single Bactrim DS was provided for UTI prophylaxis following completion of today's study per department protocol.  The risk of UTI with VUDS is low at ~2.5-3%.      Thank you for allowing me to participate in the care of Mr. Mitch Orozco and please don't hesitate to contact me with any questions or concerns.      LAVONNE Hooks, CNP  Department of Urology

## 2019-08-27 DIAGNOSIS — N40.1 BENIGN PROSTATIC HYPERPLASIA WITH LOWER URINARY TRACT SYMPTOMS, SYMPTOM DETAILS UNSPECIFIED: ICD-10-CM

## 2019-08-27 RX ORDER — DOXAZOSIN 8 MG/1
8 TABLET ORAL AT BEDTIME
Qty: 30 TABLET | Refills: 3 | Status: SHIPPED | OUTPATIENT
Start: 2019-08-27 | End: 2020-01-17

## 2019-08-27 NOTE — TELEPHONE ENCOUNTER
"Request for medication refill: Doxazosin 8mg tabs    Providers if patient needs an appointment and you are willing to give a one month supply please refill for one month and  send a letter/MyChart using \".SMILLIMITEDREFILL\" .smillimited and route chart to \"P SMI \" (Giving one month refill in non controlled medications is strongly recommended before denial)    If refill has been denied, meaning absolutely no refills without visit, please complete the smart phrase \".smirxrefuse\" and route it to the \"P SMI MED REFILLS\"  pool to inform the patient and the pharmacy.    Earline Ying CMA        "

## 2019-08-29 NOTE — PROGRESS NOTES
Urology Clinic    Ronnie Drake MD  Date of Service: 2019     Name: Mitch Orozco  MRN: [id number]  Age: 69 year old  : 1950  Referring provider: Referred Self     Assessment and Plan:  1. 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 Diagnosed with hydronephrosis from obstructive causes while in [facility] 2018    GFR was 29 from 2019        Creatinine   Date Value Ref Range Status   2019 2.20 (H) 0.66 - 1.25 mg/dL Final      UDS from 2019 showed no bladder contraction. We discussed that based on UDS, TURP would not allow him to urinate and he will need to continue intermittent catheterization vs place an indwelling catheter. Intermittent catheterization is preferable and he will be able to continue this.     He will follow up with primary care physician to monitor blood pressure and blood sugars closely to prevent further kidney damage.     3. Multiple simple appearing right-sided renal cysts.    - recheck kidney function today   - repeat renal ultrasound and creatinine in 1 year   ______________________________________________________________________    HPI  Mitch Orozco is a 69 year old male with a history of a congenital obstruction of his UPJ. Mr Orozco has a history of BPH (diagnosed 20 years ago) and had been self cathing since 2019. Has been living in [facility] for past 10 years.      He was diagnosed with hydronephrosis from obstructive causes while in [place] in 2018. He had a Hollis catheter placed with 2.6 liters of urine due to urine retention. He transitioned to self catheterization and has been doing this since 2019. He notes he cannot urinate on his own. Patient is catheterizing himself 5 times per day.      He was on dutasteride for several months but wanted to switch back to finasteride, which was refilled on 2019. Patient is also on doxazosin for his BPH.  Patient has been considering getting a TURP. Prostate was enlarged but smooth  "on MAXIM. Plan was to obtain UDS prior to considering surgery.      Patient reports that sometimes he does get some discolored at the tip of his penis and that Xylocaine gel is helpful in relieving the discomfort.     The patient denies a family history of prostate cancer. The patient denies hematuria or dysuria.      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     Today the patient reports that he has continued self-catheterizing. He does note that he had a slow flow even as a child but it has been progressively slowing over a period of time.     Review of Systems:   Pertinent items are noted in HPI or as below, remainder of complete ROS is negative.      Physical Exam:   /70   Pulse 69   Ht 1.778 m (5' 10\")   Wt 68.5 kg (151 lb)   BMI 21.67 kg/m    Constitutional: Alert, no acute distress  Psychiatric: Normal mood and affect  Gastrointestinal: Abdomen soft, non-tender.  : Deferred    UDS 08/19/2019:   -Normal bladder capacity (400 mL) with normal filling sensations.  -Good bladder compliance without DO/DOI.  -No JM.  -No appreciable detrusor contraction with attempt to void, resulting in complete urinary retention (RU: 400 mL)  -EMG quiet during attempt to void.  -Fluoroscopy reveals a moderately-trabeculated bladder wall with one diverticulum on the left lateral side.  No vesicoureteral reflux was observed.  The bladder neck was closed during filling and during voiding.     Laboratory:   I personally reviewed all applicable laboratory data and went over findings with patient  Significant for:    CBC RESULTS:  Recent Labs   Lab Test 05/30/19  0634   WBC 6.9   HGB 12.7*        BMP RESULTS:  Recent Labs   Lab Test 05/30/19  0634      POTASSIUM 4.4   CHLORIDE 108   CO2 26   ANIONGAP 6   GLC 94   BUN 37*   CR 2.20*   GFRESTIMATED 29*   GFRESTBLACK 34*   FÁTIMA 8.8     PSA RESULTS:   PSA   Date Value Ref Range Status   05/31/2019 2.52 0 - 4 ug/L Final     " Comment:     Assay Method:  Chemiluminescence using Siemens Vista analyzer     Imaging:   I personally reviewed all applicable imaging and went over the below findings with patient.    US renal complete 05/30/2019:   1. No hydronephrosis.  2. Multiple simple appearing right-sided renal cysts. No solid mass.  3. Prostatomegaly with circumferential bladder wall thickening and  trabeculation, probably sequelae of chronic bladder outlet narrowing.  Per radiology.     Scribe Disclosure:  I, Guera Cardenas, am serving as a scribe to document services personally performed by Ronnie Drake MD at this visit, based upon the provider's statements to me. All documentation has been reviewed by the aforementioned provider prior to being entered into the official medical record.    Guera Cardenas served as the scribe for this patient's visit and documented my history and physical exam.  I performed the history and physical exam.  I have edited and agree with the note.  AMELIA rDake MD    I spent over 15 minutes with the patient.  Over half this time was spent on counseling for atonic bladder.

## 2019-08-30 ENCOUNTER — OFFICE VISIT (OUTPATIENT)
Dept: UROLOGY | Facility: CLINIC | Age: 69
End: 2019-08-30
Payer: COMMERCIAL

## 2019-08-30 VITALS
BODY MASS INDEX: 21.62 KG/M2 | DIASTOLIC BLOOD PRESSURE: 70 MMHG | SYSTOLIC BLOOD PRESSURE: 130 MMHG | WEIGHT: 151 LBS | HEART RATE: 69 BPM | HEIGHT: 70 IN

## 2019-08-30 DIAGNOSIS — R33.9 URINARY RETENTION: Primary | ICD-10-CM

## 2019-08-30 PROBLEM — N28.9 IMPAIRED RENAL FUNCTION: Status: ACTIVE | Noted: 2019-08-30

## 2019-08-30 ASSESSMENT — PAIN SCALES - GENERAL: PAINLEVEL: NO PAIN (0)

## 2019-08-30 ASSESSMENT — MIFFLIN-ST. JEOR: SCORE: 1451.18

## 2019-08-30 NOTE — LETTER
2019       RE: Mitch Orozco  [address]  [address]  [facility] [address]     Dear Colleague,    Thank you for referring your patient, Mitch Orozco, to the [facility] UROLOGY AND INST FOR PROSTATE AND UROLOGIC CANCERS at [facility]. Please see a copy of my visit note below.      Urology Clinic    Ronnie Drake MD  Date of Service: 2019     Name: Mitch Orozco  MRN: [id number]  Age: 69 year old  : 1950  Referring provider: Referred Self     Assessment and Plan:  1. 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 Diagnosed with hydronephrosis from obstructive causes while in [place] 2018    GFR was 29 from 2019        Creatinine   Date Value Ref Range Status   2019 2.20 (H) 0.66 - 1.25 mg/dL Final      UDS from 2019 showed no bladder contraction. We discussed that based on UDS, TURP would not allow him to urinate and he will need to continue intermittent catheterization vs place an indwelling catheter. Intermittent catheterization is preferable and he will be able to continue this.     He will follow up with primary care physician to monitor blood pressure and blood sugars closely to prevent further kidney damage.     3. Multiple simple appearing right-sided renal cysts.    - recheck kidney function today   - repeat renal ultrasound and creatinine in 1 year   ______________________________________________________________________    HPI  Mitch Orozco is a 69 year old male with a history of a congenital obstruction of his UPJ. Mr Orozco has a history of BPH (diagnosed 20 years ago) and had been self cathing since 2019. Has been living in [place] for past 10 years.      He was diagnosed with hydronephrosis from obstructive causes while in [place] in 2018. He had a Hollis catheter placed with 2.6 liters of urine due to urine retention. He transitioned to self catheterization and has been doing this since 2019. He  "notes he cannot urinate on his own. Patient is catheterizing himself 5 times per day.      He was on dutasteride for several months but wanted to switch back to finasteride, which was refilled on 05/31/2019. Patient is also on doxazosin for his BPH.  Patient has been considering getting a TURP. Prostate was enlarged but smooth on MAXIM. Plan was to obtain UDS prior to considering surgery.      Patient reports that sometimes he does get some discolored at the tip of his penis and that Xylocaine gel is helpful in relieving the discomfort.     The patient denies a family history of prostate cancer. The patient denies hematuria or dysuria.      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     Today the patient reports that he has continued self-catheterizing. He does note that he had a slow flow even as a child but it has been progressively slowing over a period of time.     Review of Systems:   Pertinent items are noted in HPI or as below, remainder of complete ROS is negative.      Physical Exam:   /70   Pulse 69   Ht 1.778 m (5' 10\")   Wt 68.5 kg (151 lb)   BMI 21.67 kg/m     Constitutional: Alert, no acute distress  Psychiatric: Normal mood and affect  Gastrointestinal: Abdomen soft, non-tender.  : Deferred    UDS 08/19/2019:   -Normal bladder capacity (400 mL) with normal filling sensations.  -Good bladder compliance without DO/DOI.  -No JM.  -No appreciable detrusor contraction with attempt to void, resulting in complete urinary retention (RU: 400 mL)  -EMG quiet during attempt to void.  -Fluoroscopy reveals a moderately-trabeculated bladder wall with one diverticulum on the left lateral side.  No vesicoureteral reflux was observed.  The bladder neck was closed during filling and during voiding.     Laboratory:   I personally reviewed all applicable laboratory data and went over findings with patient  Significant for:    CBC RESULTS:  Recent Labs   Lab Test " 05/30/19  0634   WBC 6.9   HGB 12.7*        BMP RESULTS:  Recent Labs   Lab Test 05/30/19  0634      POTASSIUM 4.4   CHLORIDE 108   CO2 26   ANIONGAP 6   GLC 94   BUN 37*   CR 2.20*   GFRESTIMATED 29*   GFRESTBLACK 34*   FÁTIMA 8.8     PSA RESULTS:   PSA   Date Value Ref Range Status   05/31/2019 2.52 0 - 4 ug/L Final     Comment:     Assay Method:  Chemiluminescence using Siemens Vista analyzer     Imaging:   I personally reviewed all applicable imaging and went over the below findings with patient.    US renal complete 05/30/2019:   1. No hydronephrosis.  2. Multiple simple appearing right-sided renal cysts. No solid mass.  3. Prostatomegaly with circumferential bladder wall thickening and  trabeculation, probably sequelae of chronic bladder outlet narrowing.  Per radiology.     Scribe Disclosure:  I, Guera Cardenas, am serving as a scribe to document services personally performed by Ronnie Drake MD at this visit, based upon the provider's statements to me. All documentation has been reviewed by the aforementioned provider prior to being entered into the official medical record.    Guera Cardenas served as the scribe for this patient's visit and documented my history and physical exam.  I performed the history and physical exam.  I have edited and agree with the note.  AMELIA Drake MD    I spent over 15 minutes with the patient.  Over half this time was spent on counseling for atonic bladder.      Again, thank you for allowing me to participate in the care of your patient.      Sincerely,    Ronnie Drake MD

## 2019-08-30 NOTE — NURSING NOTE
Chief Complaint   Patient presents with     RECHECK     BPH follow up- discuss UDS results     Christin Mejia, CMA

## 2019-08-30 NOTE — PATIENT INSTRUCTIONS
Blood work today.    Follow up with Dr. Drake in one year with blood work and imaging prior.    It was a pleasure meeting with you today.  Thank you for allowing me and my team the privilege of caring for you today.  YOU are the reason we are here, and I truly hope we provided you with the excellent service you deserve.  Please let us know if there is anything else we can do for you so that we can be sure you are leaving completely satisfied with your care experience.        Christin Mejia, CMA

## 2019-09-23 ENCOUNTER — TRANSFERRED RECORDS (OUTPATIENT)
Dept: HEALTH INFORMATION MANAGEMENT | Facility: CLINIC | Age: 69
End: 2019-09-23

## 2019-09-23 ENCOUNTER — HOSPITAL ENCOUNTER (EMERGENCY)
Facility: CLINIC | Age: 69
Discharge: HOME | End: 2019-09-23
Attending: EMERGENCY MEDICINE | Admitting: EMERGENCY MEDICINE
Payer: MEDICARE

## 2019-09-23 VITALS
HEIGHT: 69 IN | WEIGHT: 161 LBS | HEART RATE: 80 BPM | DIASTOLIC BLOOD PRESSURE: 70 MMHG | BODY MASS INDEX: 23.85 KG/M2 | SYSTOLIC BLOOD PRESSURE: 146 MMHG | RESPIRATION RATE: 18 BRPM | OXYGEN SATURATION: 98 % | TEMPERATURE: 98.2 F

## 2019-09-23 DIAGNOSIS — R31.9 HEMATURIA, UNSPECIFIED TYPE: ICD-10-CM

## 2019-09-23 DIAGNOSIS — N30.01 ACUTE CYSTITIS WITH HEMATURIA: ICD-10-CM

## 2019-09-23 LAB
ALBUMIN UR-MCNC: 30 MG/DL
APPEARANCE UR: ABNORMAL
BACTERIA #/AREA URNS HPF: ABNORMAL /HPF
BILIRUB UR QL STRIP: NEGATIVE
COLOR UR AUTO: YELLOW
GLUCOSE UR STRIP-MCNC: NEGATIVE MG/DL
HGB UR QL STRIP: ABNORMAL
KETONES UR STRIP-MCNC: NEGATIVE MG/DL
LEUKOCYTE ESTERASE UR QL STRIP: ABNORMAL
NITRATE UR QL: NEGATIVE
PH UR STRIP: 5.5 PH (ref 5–7)
RBC #/AREA URNS AUTO: 157 /HPF (ref 0–2)
SOURCE: ABNORMAL
SP GR UR STRIP: 1.01 (ref 1–1.03)
UROBILINOGEN UR STRIP-MCNC: NORMAL MG/DL (ref 0–2)
WBC #/AREA URNS AUTO: 19 /HPF (ref 0–5)

## 2019-09-23 PROCEDURE — 87088 URINE BACTERIA CULTURE: CPT | Performed by: EMERGENCY MEDICINE

## 2019-09-23 PROCEDURE — 81001 URINALYSIS AUTO W/SCOPE: CPT | Performed by: EMERGENCY MEDICINE

## 2019-09-23 PROCEDURE — 51702 INSERT TEMP BLADDER CATH: CPT

## 2019-09-23 PROCEDURE — 87086 URINE CULTURE/COLONY COUNT: CPT | Performed by: EMERGENCY MEDICINE

## 2019-09-23 PROCEDURE — 99283 EMERGENCY DEPT VISIT LOW MDM: CPT

## 2019-09-23 PROCEDURE — 87186 SC STD MICRODIL/AGAR DIL: CPT | Performed by: EMERGENCY MEDICINE

## 2019-09-23 RX ORDER — CIPROFLOXACIN 500 MG/1
500 TABLET, FILM COATED ORAL 2 TIMES DAILY
Qty: 6 TABLET | Refills: 0 | Status: SHIPPED | OUTPATIENT
Start: 2019-09-23 | End: 2019-09-26

## 2019-09-23 RX ORDER — LIDOCAINE HYDROCHLORIDE 20 MG/ML
JELLY TOPICAL
Status: DISCONTINUED
Start: 2019-09-23 | End: 2019-09-24 | Stop reason: HOSPADM

## 2019-09-23 ASSESSMENT — ENCOUNTER SYMPTOMS
DIFFICULTY URINATING: 1
HEMATURIA: 1

## 2019-09-23 ASSESSMENT — MIFFLIN-ST. JEOR: SCORE: 1480.67

## 2019-09-23 NOTE — ED AVS SNAPSHOT
Emergency Department  [address] [place] [address]  Phone:  [phone]  Fax:  [phone]                                    Mitch Orozco   MRN: [id number]    Department:   Emergency Department   Date of Visit:  9/23/2019           After Visit Summary Signature Page    I have received my discharge instructions, and my questions have been answered. I have discussed any challenges I see with this plan with the nurse or doctor.    ..........................................................................................................................................  Patient/Patient Representative Signature      ..........................................................................................................................................  Patient Representative Print Name and Relationship to Patient    ..................................................               ................................................  Date                                   Time    ..........................................................................................................................................  Reviewed by Signature/Title    ...................................................              ..............................................  Date                                               Time          22EPIC Rev 08/18

## 2019-09-24 NOTE — ED TRIAGE NOTES
Pt. self caths and had last voided by cath at 1400. Seen at urgency room earlier and cath placed but only clots now.

## 2019-09-24 NOTE — ED PROVIDER NOTES
"  History     Chief Complaint:  Urinary issue     HPI   Mitch Orozco is a 69 year old male who is not anticoagulated and presents with urinary issue. The patient self catheterized today around 1350 as usual and noticed yellow on the tip of his penis afterwards and has had a cold for the past couple of days along with a subjective fever thus he visited a clinic for a urinary tract infection workup. At clinic, they gave him a bigger cath than usual which caused some bleeding. He tried another one that was smaller but this was unsuccessful as the patient believes there are clots in the way. He tried his normal catheters at home and these did not work as well. He visited [facility] in [facility] where they tried a lockett catheter and this was unsuccessful as well. The patient believes he needs a 3 way catheter and has visited the ED. Here, he states he sees Dr. Drake at [facility].     Allergies:  The patient has no known drug allergies.    Medications:    ciprofloxacin (CIPRO) 500 MG tablet  doxazosin (CARDURA) 8 MG tablet  finasteride (PROSCAR) 5 MG tablet  order for DME  PARoxetine (PAXIL) 30 MG tablet      Past Medical History:    Benign prostatic hyperplasia   Chronic kidney disease   Depression  Impaired renal function     Past Surgical History:    The patient does not have any pertinent past surgical history.     Family History:    Coronary artery disease     Social History:  Marital Status:     Smoker:   Never   Smokeless:   Never   Alcohol:   Negative   Drugs:   Negative     Review of Systems   Genitourinary: Positive for difficulty urinating and hematuria.   All other systems reviewed and are negative.    Physical Exam     Patient Vitals for the past 24 hrs:   BP Temp Temp src Pulse Resp SpO2 Height Weight   09/23/19 2055 138/74 98.2  F (36.8  C) Oral 80 18 98 % 1.753 m (5' 9\") 73 kg (161 lb)     Physical Exam  GENERAL: well developed, pleasant. Blood tinged underwear.   HEAD: atraumatic  EYES: " pupils reactive, extraocular muscles intact, conjunctivae normal  ENT:  mucus membranes moist  NECK:  trachea midline, normal range of motion  RESPIRATORY: no tachypnea, breath sounds clear to auscultation   CVS: normal S1/S2, no murmurs, intact distal pulses  ABDOMEN: soft, nontender, nondistention. Mild fullness in lower pelvis  MUSCULOSKELETAL: no deformities  SKIN: warm and dry, no acute rashes or ulceration  NEURO: GCS 15, cranial nerves intact, alert and oriented x3  PSYCH:  Mood/affect normal    Emergency Department Course   Laboratory:  UA with Microscopic: blood moderate, albumin 30, leukocyte esterase large, , WBC 19, bacteria many, o/w WNL  Urine culture aerobic bacterial: pending    Emergency Department Course:  2103 I performed an exam of the patient as documented above.     Findings and plan explained. Patient discharged home with instructions regarding supportive care, medications, and reasons to return. The importance of close follow-up was reviewed. I personally reviewed the workup results with them and answered all related questions prior to discharge.    Impression & Plan      Medical Decision Making:    Presents with urinary retention in the setting of self catheters.  He notes having clots earlier today in inability to pass catheter and is tried a few different times today and has been at 2 different urgent cares.  He replaced a three-way catheter and drained out 1200 cc of pink-colored urine.  No clots were noted.  It looks to be draining well.  Catheter is left in place.  Given all the catheterizations and hematuria will cover him with antibiotics.  Discussed outpatient management with him and following up with his urologist.    Diagnosis:    ICD-10-CM    1. Acute cystitis with hematuria N30.01    2. Hematuria, unspecified type R31.9      Disposition:  discharged to home with ciprofloxacin     Discharge Medications:  New Prescriptions    CIPROFLOXACIN (CIPRO) 500 MG TABLET    Take 1  tablet (500 mg) by mouth 2 times daily for 3 days     Scribe Disclosure:  I, Jesus Pedraza, am serving as a scribe on 9/23/2019 at 9:03 PM to personally document services performed by Michael Hodgson MD based on my observations and the provider's statements to me.     Jesus Pedraza  9/23/2019    EMERGENCY DEPARTMENT       Michael Hodgson MD  09/23/19 0596

## 2019-09-25 ENCOUNTER — TELEPHONE (OUTPATIENT)
Dept: UROLOGY | Facility: CLINIC | Age: 69
End: 2019-09-25

## 2019-09-25 NOTE — TELEPHONE ENCOUNTER
[facility] Call Center    Phone Message    May a detailed message be left on voicemail: yes    Reason for Call: Other: Patients states that upon discharge from the hospital they were instructed to follow up with uro clinic within two days. next available is 10/7. Please follow up with patient on appropriate timeline of care.     Action Taken: Message routed to:  [facility] ([facility] priscila uro

## 2019-09-25 NOTE — TELEPHONE ENCOUNTER
[facility] Call [facility]    Phone Message    May a detailed message be left on voicemail: yes    Reason for Call: Other: Patient states that they are not particularly happy with current provider and the [place] location would be closer to them. Would like to discuss switching MDs to see someone at [place] instead. Please follow up w/ pt.     Action Taken: Message routed to:  [facility] ([facility] priscila uro

## 2019-09-25 NOTE — TELEPHONE ENCOUNTER
Pt called, he currently has a catheter in place and will keep his appointment with Alyx as scheduled. Pt placed on wait list.

## 2019-09-27 LAB
BACTERIA SPEC CULT: ABNORMAL
Lab: ABNORMAL
SPECIMEN SOURCE: ABNORMAL

## 2019-09-27 ASSESSMENT — ENCOUNTER SYMPTOMS
COUGH: 1
DIFFICULTY URINATING: 1
DYSURIA: 0
HOARSE VOICE: 1
SINUS PAIN: 0
SHORTNESS OF BREATH: 0
SPUTUM PRODUCTION: 1
WEIGHT GAIN: 0
POLYDIPSIA: 0
HEMOPTYSIS: 0
WHEEZING: 0
WEIGHT LOSS: 0
POLYPHAGIA: 0
FEVER: 1
NECK MASS: 0
FATIGUE: 1
INCREASED ENERGY: 0
HEMATURIA: 1
FLANK PAIN: 0
HALLUCINATIONS: 0
COUGH DISTURBING SLEEP: 1
DYSPNEA ON EXERTION: 0
NIGHT SWEATS: 1
TASTE DISTURBANCE: 0
SMELL DISTURBANCE: 0
CHILLS: 0
SORE THROAT: 0
TROUBLE SWALLOWING: 0
POSTURAL DYSPNEA: 0
DECREASED APPETITE: 0
ALTERED TEMPERATURE REGULATION: 0
SINUS CONGESTION: 1
SNORES LOUDLY: 1

## 2019-09-28 ENCOUNTER — HEALTH MAINTENANCE LETTER (OUTPATIENT)
Age: 69
End: 2019-09-28

## 2019-09-30 ENCOUNTER — OFFICE VISIT (OUTPATIENT)
Dept: UROLOGY | Facility: CLINIC | Age: 69
End: 2019-09-30
Payer: MEDICARE

## 2019-09-30 VITALS
HEART RATE: 75 BPM | OXYGEN SATURATION: 97 % | BODY MASS INDEX: 23.7 KG/M2 | WEIGHT: 160 LBS | SYSTOLIC BLOOD PRESSURE: 128 MMHG | HEIGHT: 69 IN | DIASTOLIC BLOOD PRESSURE: 64 MMHG

## 2019-09-30 DIAGNOSIS — N28.9 RENAL INSUFFICIENCY: ICD-10-CM

## 2019-09-30 DIAGNOSIS — N40.1 BENIGN PROSTATIC HYPERPLASIA WITH URINARY OBSTRUCTION: ICD-10-CM

## 2019-09-30 DIAGNOSIS — N13.8 BENIGN PROSTATIC HYPERPLASIA WITH URINARY OBSTRUCTION: ICD-10-CM

## 2019-09-30 DIAGNOSIS — R33.9 URINARY RETENTION: Primary | ICD-10-CM

## 2019-09-30 PROCEDURE — 99215 OFFICE O/P EST HI 40 MIN: CPT | Performed by: UROLOGY

## 2019-09-30 SDOH — HEALTH STABILITY: MENTAL HEALTH: HOW OFTEN DO YOU HAVE A DRINK CONTAINING ALCOHOL?: NEVER

## 2019-09-30 ASSESSMENT — MIFFLIN-ST. JEOR: SCORE: 1476.14

## 2019-09-30 ASSESSMENT — PAIN SCALES - GENERAL: PAINLEVEL: NO PAIN (0)

## 2019-09-30 NOTE — LETTER
9/30/2019       RE: Mitch Orozco  [address]  [facility] [address]     Dear Colleague,    Thank you for referring your patient, Mitch Orozco, to the [facility] at [facility]. Please see a copy of my visit note below.    History: it is a pleasure to see this very pleasant 69-year-old gentleman in initial consultation today.  This is our first meeting.  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  He had been living in [place] for some time tand was found to have hydronephrosisand urinary retention while in [place] December 2018.  Initially was catheterized in [place] and almost 3 L of urine was drained from the bladder and since  he has been performing sterile self intermittent catheterization since February 2019.  He also has renal insufficiency, with the most recent creatinine being 2.20.  ltrasound examination was performed on 30 May of 2019 of the kidneys.  This showed no evidence of hydronephrosis,, some evidence of parenchymal atrophy of both kidneys and enlargement of the prostate gland with thickening of the bladder wall.  Recently he had some difficulties  With catheterization and a Hollis catheter was placed in the bladder which is been in place now for about one week.  Earlier urodynamic studies have been performed which showed good bladder compliance, bladder capacity of 400 ccbut no appreciable detrusor contraction was elicitedwhile attempting to void..  There is a report that cystoscopy had been performed showing significantenlargement of the prostate and obstruction at the bladder neck.      Past Medical History:   Diagnosis Date     BPH (benign prostatic hyperplasia)      CKD (chronic kidney disease)      Depression        Social History     Socioeconomic History     Marital status:      Spouse name: None     Number of children: None     Years of education: None     Highest  education level: None   Occupational History     None   Social Needs     Financial resource strain: None     Food insecurity:     Worry: None     Inability: None     Transportation needs:     Medical: None     Non-medical: None   Tobacco Use     Smoking status: Never Smoker     Smokeless tobacco: Never Used   Substance and Sexual Activity     Alcohol use: Never     Frequency: Never     Drug use: Not Currently     Sexual activity: None   Lifestyle     Physical activity:     Days per week: None     Minutes per session: None     Stress: None   Relationships     Social connections:     Talks on phone: None     Gets together: None     Attends Gnosticism service: None     Active member of club or organization: None     Attends meetings of clubs or organizations: None     Relationship status: None     Intimate partner violence:     Fear of current or ex partner: None     Emotionally abused: None     Physically abused: None     Forced sexual activity: None   Other Topics Concern     Parent/sibling w/ CABG, MI or angioplasty before 65F 55M? Not Asked   Social History Narrative     None       Past Surgical History:   Procedure Laterality Date     VASECTOMY         Family History   Problem Relation Age of Onset     Coronary Artery Disease Maternal Grandmother      Alzheimer Disease Father          Current Outpatient Medications:      doxazosin (CARDURA) 8 MG tablet, Take 1 tablet (8 mg) by mouth At Bedtime, Disp: 30 tablet, Rfl: 3     finasteride (PROSCAR) 5 MG tablet, Take 1 tablet (5 mg) by mouth daily, Disp: 30 tablet, Rfl: 1     order for DME, Equipment being ordered:   12 Cymro intermittent straight catheter red vinyl, Disp: 180 catheter, Rfl: 3     PARoxetine (PAXIL) 30 MG tablet, Take 1 tablet (30 mg) by mouth every morning, Disp: 30 tablet, Rfl: 3    10 point ROS of systems including Constitutional, Eyes, Respiratory, Cardiovascular, Gastroenterology, Genitourinary, Integumentary, Muscularskeletal, Psychiatric and  "Neurologic were all negative except for pertinent positives noted in my HPI.    Examination:   /64 (BP Location: Right arm, Patient Position: Sitting, Cuff Size: Adult Regular)   Pulse 75   Ht 1.753 m (5' 9\")   Wt 72.6 kg (160 lb)   SpO2 97%   BMI 23.63 kg/m     General Impression: Very pleasant patient in no acute distress, well-oriented in time place and person and quite conversational  Mental Status: normal  HEENT: Extraocular movements intact.  No clinical evidence of jaundice on examination of eyes.  Mucous membranes are unremarkable  Skin: Warm.  No other abnormalities  Respiratory System: Unlabored on room air.  Respiratory cycle normal  Lymph Nodes: negative  Back/Flank Tenderness: no flank tenderness  Cardiovascular System: No significant peripheral pitting edema  Abdominal Examination: the abdomen is not obese  Extremities: the extremities are unremarkable  Genitial: there is a Hollis catheter extending from the penis  Rectal Examination: not examined because of indwelling catheter  Neurologic System: There are no significant acute abnormal neurological signs in the central or peripheral nervous systems    Impression: I went over his records in detail today.  This is clearly a somewhat complicated situation.  We have a number of important issues  1.  Urinary retention.  We will remove the catheter today and he can restart self intermittent catheterization.  I will arrange for cystoscopy reevaluate the bladder outlet.  I've had a careful discussion with the patient and his wife about how to proceed and we may need to consider the possibility of transurethral resection even if the detrusor is compromised if there is evidence of marked obstruction.  It is conceivable that if the prostate is well resected he may be able to void spontaneouslyphasic abdominal muscles with the Credé maneuver.  2.  Patient should see a nephrologist about his renal insufficiency as this will need to be monitored " closely.the most recent creatinine that I see is 2.20  We will schedule thisin the near future    Plan: .we will schedule cystoscopy and we'll arrange for the patient to see a nephrologist.  We will make dispensation's based on the results    Time: this is a complicated situation  Who I was seen for the first time today.  Total time therefore review of all records pertinent labs, radiologic studies discussions and decision making was up to 40 minutes      Answers for HPI/ROS submitted by the patient on 9/27/2019   General Symptoms: Yes  Skin Symptoms: No  HENT Symptoms: Yes  EYE SYMPTOMS: No  HEART SYMPTOMS: No  LUNG SYMPTOMS: Yes  INTESTINAL SYMPTOMS: No  URINARY SYMPTOMS: Yes  REPRODUCTIVE SYMPTOMS: No  SKELETAL SYMPTOMS: No  BLOOD SYMPTOMS: No  NERVOUS SYSTEM SYMPTOMS: No  MENTAL HEALTH SYMPTOMS: No  Fever: Yes  Loss of appetite: No  Weight loss: No  Weight gain: No  Fatigue: Yes  Night sweats: Yes  Chills: No  Increased stress: No  Excessive hunger: No  Excessive thirst: No  Feeling hot or cold when others believe the temperature is normal: No  Loss of height: No  Post-operative complications: No  Surgical site pain: No  Hallucinations: No  Change in or Loss of Energy: No  Hyperactivity: No  Confusion: No  Ear pain: No  Ear discharge: No  Hearing loss: No  Tinnitus: No  Nosebleeds: No  Congestion: Yes  Sinus pain: No  Trouble swallowing: No   Voice hoarseness: Yes  Mouth sores: Yes  Sore throat: No  Tooth pain: No  Gum tenderness: No  Bleeding gums: No  Change in taste: No  Change in sense of smell: No  Dry mouth: No  Hearing aid used: No  Neck lump: No  Cough: Yes  Sputum or phlegm: Yes  Coughing up blood: No  Difficulty breating or shortness of breath: No  Snoring: Yes  Wheezing: No  Difficulty breathing on exertion: No  Nighttime Cough: Yes  Difficulty breathing when lying flat: No  Trouble holding urine or incontinence: No  Pain or burning: No  Trouble starting or stopping: No  Increased frequency of  urination: No  Blood in urine: Yes  Decreased frequency of urination: No  Frequent nighttime urination: No  Flank pain: No  Difficulty emptying bladder: Yes      Again, thank you for allowing me to participate in the care of your patient.      Sincerely,    Marc Bright MD

## 2019-09-30 NOTE — NURSING NOTE
Chief Complaint   Patient presents with     Other     patient here to discuss possible cath removal.        Catheter removal documentation on 9/30/2019:    Mitch Orozco presents to the clinic for catheter removal.  Reason for removal: retention   Order has been verified.   Catheter successfully removed at 4:45 PM without immediate complication.  250 cc's of urine present in the catheter bag.  Urethral meatus is free of secretions and encrustation.  The patient is afebrile.  The patient tolerated the procedure and was instructed to follow up with their PCP or consultant as planned, monitor for catheter dysfunction, monitor for pain or discomfort, return or call for pain, fever, leakage or decreased urine flow and watch for signs of infection      Patient would like to get self caths from new supplier. He will be self cathing 2X daily using a Coloplast 10f straight tip.     Miladys Begum, CMA

## 2019-09-30 NOTE — PROGRESS NOTES
History: it is a pleasure to see this very pleasant 69-year-old gentleman in initial consultation today.  This is our first meeting.  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  He had been living in [place] for some time tan[place] was found to have hydronephrosisand urinary retention while in [place] December 2018.  Initially was catheterized in [place] and almost 3 L of urine was drained from the bladder and since  he has been performing sterile self intermittent catheterization since February 2019.  He also has renal insufficiency, with the most recent creatinine being 2.20.  ltrasound examination was performed on 30 May of 2019 of the kidneys.  This showed no evidence of hydronephrosis,, some evidence of parenchymal atrophy of both kidneys and enlargement of the prostate gland with thickening of the bladder wall.  Recently he had some difficulties  With catheterization and a Hollis catheter was placed in the bladder which is been in place now for about one week.  Earlier urodynamic studies have been performed which showed good bladder compliance, bladder capacity of 400 ccbut no appreciable detrusor contraction was elicitedwhile attempting to void..  There is a report that cystoscopy had been performed showing significantenlargement of the prostate and obstruction at the bladder neck.      Past Medical History:   Diagnosis Date     BPH (benign prostatic hyperplasia)      CKD (chronic kidney disease)      Depression        Social History     Socioeconomic History     Marital status:      Spouse name: None     Number of children: None     Years of education: None     Highest education level: None   Occupational History     None   Social Needs     Financial resource strain: None     Food insecurity:     Worry: None     Inability: None     Transportation needs:     Medical: None     Non-medical: None   Tobacco Use     Smoking status: Never Smoker     Smokeless tobacco: Never Used   Substance  "and Sexual Activity     Alcohol use: Never     Frequency: Never     Drug use: Not Currently     Sexual activity: None   Lifestyle     Physical activity:     Days per week: None     Minutes per session: None     Stress: None   Relationships     Social connections:     Talks on phone: None     Gets together: None     Attends Spiritism service: None     Active member of club or organization: None     Attends meetings of clubs or organizations: None     Relationship status: None     Intimate partner violence:     Fear of current or ex partner: None     Emotionally abused: None     Physically abused: None     Forced sexual activity: None   Other Topics Concern     Parent/sibling w/ CABG, MI or angioplasty before 65F 55M? Not Asked   Social History Narrative     None       Past Surgical History:   Procedure Laterality Date     VASECTOMY         Family History   Problem Relation Age of Onset     Coronary Artery Disease Maternal Grandmother      Alzheimer Disease Father          Current Outpatient Medications:      doxazosin (CARDURA) 8 MG tablet, Take 1 tablet (8 mg) by mouth At Bedtime, Disp: 30 tablet, Rfl: 3     finasteride (PROSCAR) 5 MG tablet, Take 1 tablet (5 mg) by mouth daily, Disp: 30 tablet, Rfl: 1     order for DME, Equipment being ordered:   12 Azeri intermittent straight catheter red vinyl, Disp: 180 catheter, Rfl: 3     PARoxetine (PAXIL) 30 MG tablet, Take 1 tablet (30 mg) by mouth every morning, Disp: 30 tablet, Rfl: 3    10 point ROS of systems including Constitutional, Eyes, Respiratory, Cardiovascular, Gastroenterology, Genitourinary, Integumentary, Muscularskeletal, Psychiatric and Neurologic were all negative except for pertinent positives noted in my HPI.    Examination:   /64 (BP Location: Right arm, Patient Position: Sitting, Cuff Size: Adult Regular)   Pulse 75   Ht 1.753 m (5' 9\")   Wt 72.6 kg (160 lb)   SpO2 97%   BMI 23.63 kg/m    General Impression: Very pleasant patient in no " acute distress, well-oriented in time place and person and quite conversational  Mental Status: normal  HEENT: Extraocular movements intact.  No clinical evidence of jaundice on examination of eyes.  Mucous membranes are unremarkable  Skin: Warm.  No other abnormalities  Respiratory System: Unlabored on room air.  Respiratory cycle normal  Lymph Nodes: negative  Back/Flank Tenderness: no flank tenderness  Cardiovascular System: No significant peripheral pitting edema  Abdominal Examination: the abdomen is not obese  Extremities: the extremities are unremarkable  Genitial: there is a Hollis catheter extending from the penis  Rectal Examination: not examined because of indwelling catheter  Neurologic System: There are no significant acute abnormal neurological signs in the central or peripheral nervous systems    Impression: I went over his records in detail today.  This is clearly a somewhat complicated situation.  We have a number of important issues  1.  Urinary retention.  We will remove the catheter today and he can restart self intermittent catheterization.  I will arrange for cystoscopy reevaluate the bladder outlet.  I've had a careful discussion with the patient and his wife about how to proceed and we may need to consider the possibility of transurethral resection even if the detrusor is compromised if there is evidence of marked obstruction.  It is conceivable that if the prostate is well resected he may be able to void spontaneouslyphasic abdominal muscles with the Credé maneuver.  2.  Patient should see a nephrologist about his renal insufficiency as this will need to be monitored closely.the most recent creatinine that I see is 2.20  We will schedule thisin the near future    Plan: .we will schedule cystoscopy and we'll arrange for the patient to see a nephrologist.  We will make dispensation's based on the results    Time: this is a complicated situation  Who I was seen for the first time today.  Total  time therefore review of all records pertinent labs, radiologic studies discussions and decision making was up to 40 minutes      Answers for HPI/ROS submitted by the patient on 9/27/2019   General Symptoms: Yes  Skin Symptoms: No  HENT Symptoms: Yes  EYE SYMPTOMS: No  HEART SYMPTOMS: No  LUNG SYMPTOMS: Yes  INTESTINAL SYMPTOMS: No  URINARY SYMPTOMS: Yes  REPRODUCTIVE SYMPTOMS: No  SKELETAL SYMPTOMS: No  BLOOD SYMPTOMS: No  NERVOUS SYSTEM SYMPTOMS: No  MENTAL HEALTH SYMPTOMS: No  Fever: Yes  Loss of appetite: No  Weight loss: No  Weight gain: No  Fatigue: Yes  Night sweats: Yes  Chills: No  Increased stress: No  Excessive hunger: No  Excessive thirst: No  Feeling hot or cold when others believe the temperature is normal: No  Loss of height: No  Post-operative complications: No  Surgical site pain: No  Hallucinations: No  Change in or Loss of Energy: No  Hyperactivity: No  Confusion: No  Ear pain: No  Ear discharge: No  Hearing loss: No  Tinnitus: No  Nosebleeds: No  Congestion: Yes  Sinus pain: No  Trouble swallowing: No   Voice hoarseness: Yes  Mouth sores: Yes  Sore throat: No  Tooth pain: No  Gum tenderness: No  Bleeding gums: No  Change in taste: No  Change in sense of smell: No  Dry mouth: No  Hearing aid used: No  Neck lump: No  Cough: Yes  Sputum or phlegm: Yes  Coughing up blood: No  Difficulty breating or shortness of breath: No  Snoring: Yes  Wheezing: No  Difficulty breathing on exertion: No  Nighttime Cough: Yes  Difficulty breathing when lying flat: No  Trouble holding urine or incontinence: No  Pain or burning: No  Trouble starting or stopping: No  Increased frequency of urination: No  Blood in urine: Yes  Decreased frequency of urination: No  Frequent nighttime urination: No  Flank pain: No  Difficulty emptying bladder: Yes

## 2019-10-03 ENCOUNTER — MEDICAL CORRESPONDENCE (OUTPATIENT)
Dept: HEALTH INFORMATION MANAGEMENT | Facility: CLINIC | Age: 69
End: 2019-10-03

## 2019-10-04 ENCOUNTER — OFFICE VISIT (OUTPATIENT)
Dept: FAMILY MEDICINE | Facility: CLINIC | Age: 69
End: 2019-10-04
Payer: COMMERCIAL

## 2019-10-04 VITALS
WEIGHT: 160.8 LBS | HEART RATE: 75 BPM | RESPIRATION RATE: 16 BRPM | BODY MASS INDEX: 23.02 KG/M2 | OXYGEN SATURATION: 97 % | HEIGHT: 70 IN | TEMPERATURE: 97.5 F | DIASTOLIC BLOOD PRESSURE: 66 MMHG | SYSTOLIC BLOOD PRESSURE: 105 MMHG

## 2019-10-04 DIAGNOSIS — Z13.9 SCREENING FOR CONDITION: Primary | ICD-10-CM

## 2019-10-04 DIAGNOSIS — N18.30 CHRONIC KIDNEY DISEASE, STAGE 3 (MODERATE): ICD-10-CM

## 2019-10-04 LAB
BUN SERPL-MCNC: 35.9 MG/DL (ref 7–21)
CALCIUM SERPL-MCNC: 8.8 MG/DL (ref 8.5–10.1)
CHLORIDE SERPLBLD-SCNC: 103.3 MMOL/L (ref 98–110)
CO2 SERPL-SCNC: 26 MMOL/L (ref 20–32)
CREAT SERPL-MCNC: 2.3 MG/DL (ref 0.7–1.3)
GFR SERPL CREATININE-BSD FRML MDRD: 30.4 ML/MIN/1.7 M2
GLUCOSE SERPL-MCNC: 156.5 MG'DL (ref 70–99)
POTASSIUM SERPL-SCNC: 4.1 MMOL/DL (ref 3.3–4.5)
SODIUM SERPL-SCNC: 138.2 MMOL/L (ref 132.6–141.4)

## 2019-10-04 ASSESSMENT — MIFFLIN-ST. JEOR: SCORE: 1488.33

## 2019-10-04 ASSESSMENT — PATIENT HEALTH QUESTIONNAIRE - PHQ9: SUM OF ALL RESPONSES TO PHQ QUESTIONS 1-9: 0

## 2019-10-04 NOTE — PROGRESS NOTES
"       HPI       Mitch Orozco is a 69 year old  who presents for   Chief Complaint   Patient presents with     kidney test     He wants his kidney functions tested today     Patient here to follow-up on his renal function.  Patient was recently in the emergency department with acute urinary retention and required Hollis catheter placement.  Patient has followed up with urology and had Hollis removed and is back to intermittent catheterization.  He is in the process of considering a TURP procedure and it sounds like they are planning on doing a cystoscopy to get some more information prior to making this decision.  Patient is otherwise feeling well today and has no concerns.         +++++++      Problem, Medication and Allergy Lists were reviewed and updated if needed..    Patient is an established patient of this clinic..         Review of Systems:   Review of Systems         Physical Exam:     Vitals:    10/04/19 0848   BP: 105/66   BP Location: Left arm   Patient Position: Sitting   Cuff Size: Adult Regular   Pulse: 75   Resp: 16   Temp: 97.5  F (36.4  C)   TempSrc: Oral   SpO2: 97%   Weight: 72.9 kg (160 lb 12.8 oz)   Height: 1.766 m (5' 9.54\")     Body mass index is 23.38 kg/m .  Vitals were reviewed and were normal     Physical Exam  General- No acute distress, well-appearing  Skin- warm, no obvious skin lesions  Neuro- AOX3, CN 2-12 grossly intact  Abdomen- non-distended, no suprapubic tenderness  Cardio- RRR, no murmurs  Pulmonary- CTA in all fields, no wheezes or rhales  Psych- appropriate mood and affect    Results:   No testing ordered today    Assessment and Plan        Mitch was seen today for kidney test.    Diagnoses and all orders for this visit:    Screening for condition  -     Basic Metabolic Panel ([facility])    Chronic kidney disease, stage 3 (moderate) (H)    Renal function relatively stable.  Patient has had a renal ultrasound previously.  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  Patient is working with urology on this issue and is considering a TURP procedure.  Discussed in clinic today possibility of decreasing Paxil dose as it should be dose adjusted with GFR below 30. Has potential for increased anticholinergic side effects at supratherapeutic doses. Laurent said he wants to think about this and discuss more at next visit       There are no discontinued medications.    Options for treatment and follow-up care were reviewed with the patient. Mitch Orozco  engaged in the decision making process and verbalized understanding of the options discussed and agreed with the final plan.    Javier Burr, DO

## 2019-10-17 ENCOUNTER — OFFICE VISIT (OUTPATIENT)
Dept: UROLOGY | Facility: CLINIC | Age: 69
End: 2019-10-17
Payer: MEDICARE

## 2019-10-17 VITALS
SYSTOLIC BLOOD PRESSURE: 138 MMHG | OXYGEN SATURATION: 97 % | DIASTOLIC BLOOD PRESSURE: 80 MMHG | BODY MASS INDEX: 23.7 KG/M2 | HEART RATE: 70 BPM | WEIGHT: 160 LBS | HEIGHT: 69 IN

## 2019-10-17 DIAGNOSIS — N40.1 BENIGN PROSTATIC HYPERPLASIA WITH URINARY OBSTRUCTION: Primary | ICD-10-CM

## 2019-10-17 DIAGNOSIS — Z79.2 PROPHYLACTIC ANTIBIOTIC: ICD-10-CM

## 2019-10-17 DIAGNOSIS — N13.8 BENIGN PROSTATIC HYPERPLASIA WITH URINARY OBSTRUCTION: Primary | ICD-10-CM

## 2019-10-17 LAB
ALBUMIN UR-MCNC: NEGATIVE MG/DL
APPEARANCE UR: CLEAR
BILIRUB UR QL STRIP: NEGATIVE
COLOR UR AUTO: YELLOW
GLUCOSE UR STRIP-MCNC: NEGATIVE MG/DL
HGB UR QL STRIP: ABNORMAL
KETONES UR STRIP-MCNC: NEGATIVE MG/DL
LEUKOCYTE ESTERASE UR QL STRIP: ABNORMAL
NITRATE UR QL: NEGATIVE
PH UR STRIP: 5.5 PH (ref 5–7)
SOURCE: ABNORMAL
SP GR UR STRIP: 1.02 (ref 1–1.03)
UROBILINOGEN UR STRIP-ACNC: 0.2 EU/DL (ref 0.2–1)

## 2019-10-17 PROCEDURE — 81003 URINALYSIS AUTO W/O SCOPE: CPT | Performed by: UROLOGY

## 2019-10-17 PROCEDURE — 99214 OFFICE O/P EST MOD 30 MIN: CPT | Mod: 25 | Performed by: UROLOGY

## 2019-10-17 PROCEDURE — 52000 CYSTOURETHROSCOPY: CPT | Performed by: UROLOGY

## 2019-10-17 RX ORDER — CIPROFLOXACIN 500 MG/1
500 TABLET, FILM COATED ORAL ONCE
Qty: 1 TABLET | Refills: 0 | Status: SHIPPED | OUTPATIENT
Start: 2019-10-17 | End: 2019-10-17

## 2019-10-17 RX ORDER — LIDOCAINE HYDROCHLORIDE 20 MG/ML
JELLY TOPICAL ONCE
Status: COMPLETED | OUTPATIENT
Start: 2019-10-17 | End: 2019-10-17

## 2019-10-17 RX ADMIN — LIDOCAINE HYDROCHLORIDE: 20 JELLY TOPICAL at 16:30

## 2019-10-17 ASSESSMENT — MIFFLIN-ST. JEOR: SCORE: 1476.14

## 2019-10-17 ASSESSMENT — PAIN SCALES - GENERAL: PAINLEVEL: NO PAIN (0)

## 2019-10-17 NOTE — PROGRESS NOTES
This very pleasant 69-year-old gentleman returns today for cystoscopy.  He had been living in [place] for some time tand was found to have hydronephrosisand urinary retention while in [place] December 2018.  Initially was catheterized in [place] and almost 3 L of urine was drained from the bladder and since  he has been performing sterile self intermittent catheterization since February 2019.  He also has renal insufficiency, with the most recent creatinine being 2.20.  ltrasound examination was performed on 30 May of 2019 of the kidneys.  This showed no evidence of hydronephrosis,, some evidence of parenchymal atrophy of both kidneys and enlargement of the prostate gland with thickening of the bladder wall.  Recently he had some difficulties  With catheterization and a Hollis catheter was placed in the bladder which is been in place now for about one week.  Earlier urodynamic studies have been performed which showed good bladder compliance, bladder capacity of 400 ccbut no appreciable detrusor contraction was elicitedwhile attempting to void..  There is a report that cystoscopy had been performed showing significantenlargement of the prostate and obstruction at the bladder neck.  He has been performing sterile self intermittent catheterization currently 5 times a day, but has significant renal insufficiency the most recent creatinine being 2.3.  He has a pending meeting with our nephrologist to discuss this further.  Urinalysis today showed only a very small amount of leukocyte esterase and a trace of blood in the urine was nitrite negative     Procedure.  Cystoscopy.   Surgeon.    Deangelo  Anesthesia.  Local anesthesia.  Description.  With the patient in the supine position, with the genital area prepped and draped in the customary fashion, with local anesthetic and the urethra, the flexible cystoscope was Inserted.  The penile urethra is normal.  The external sphincter is intact.  When viewed from verumontanum there is  significant enlargement of the lateral lobes of the prostate with the meeting in the midline.  When viewed retrospectively there is a moderate median lobe extending into the bladder.  There is grade 2 trabeculation with quite marked saccule formation seen in the bladder.  There is no evidence of neoplasm or stone in the bladder.  There are no other remarkable features.    Impression.  Following the meeting I met with the patient and his wife in my office to review the situation which is a somewhat complicated one.  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  The cystoscopy today does show significant intravesical obstruction.  In my opinion there are 2 options.  1.  We can continue with self intermittent catheterization.  2.  We could do a transurethral resection of the prostate.    Long-term self intermittent catheterization could be challenging tickly years ago by.  Transurethral resection of the prostate is certainly the goal standard for a situation such as this with evidence of detrusor decompensation and no significant intravesical obstruction and I do not think he would be a candidate for medical treatments such as the use of an alpha-blocker or a 5 alpha reductase inhibitor or for that matter other interventional options including laser TURP.  The issue I cannot guarantee however is his ability to void even if relieve the obstruction because of uncertainty about the integrity of the detrusor muscle.  If of course he does not able to void after TURP he could also resume self intermittent catheterization.  I went over TURP discussed the procedure in detail with him today including the potential details of the procedure, side effects complications and expectations  We discussed this in  "considerable detail today.  We will need to have the opinions of our nephrology colleagues advising us of the best way to protect the upper urinary tract in the long-term.  We decided at the moment that he will continue self intermittent catheterization.  He does have some important eye surgery coming up shortly and when this is resolved we will likely go ahead with a transurethral resection of the prostate.  By then also he was seen a nephrologist for good advice from them to..    Plan.  We will likely need to do a cystoscopy and transurethral resection of the prostate but the patient will let us know when he wants to do that.    Time.  We did have a lengthy discussion today following cystoscopy dislodging 25 minutes covering a large area of what is a very complex situation including a number of different therapeutic options and issues over and above just the obstructive situation by itself.    \"This dictation was performed with voice recognition software and may contain errors,  omissions and inadvertent word substitution.\"      I had a lengthy and careful discussion with the patient and his wife in detail today.  I answered many questions    .       "

## 2019-10-17 NOTE — NURSING NOTE
Chief Complaint   Patient presents with     Cystoscopy     patient is here for cysto.      Prior to the start of the procedure and with procedural staff participation, I verbally confirmed the patient s identity using two indicators, relevant allergies, that the procedure was appropriate and matched the consent or emergent situation, and that the correct equipment/implants were available. Immediately prior to starting the procedure I conducted the Time Out with the procedural staff and re-confirmed the patient s name, procedure, and site/side. I have wiped the patient off with the povidone-Iodine solution, draped them,  used Lidocaine hydrochloride jelly, and instilled sterile water into the bladder. (The Joint Commission universal protocol was followed.)  Yes    Sedation (Moderate or Deep): None    5mL 2% lidocaine hydrochloride Urojet instilled into urethra.    NDC# [address]-04  Lot #: [id number]    Expiration Date:  06/2021    Miladys Begum CMA

## 2019-10-17 NOTE — LETTER
10/17/2019       RE: Mitch Orozco  [address]  [facility] [address]     Dear Colleague,    Thank you for referring your patient, Mitch Orozco, to the [facility] at [facility]. Please see a copy of my visit note below.    This very pleasant 69-year-old gentleman returns today for cystoscopy.  He had been living in [place] for some time tand was found to have hydronephrosisand urinary retention while in [place] December 2018.  Initially was catheterized in [place] and almost 3 L of urine was drained from the bladder and since  he has been performing sterile self intermittent catheterization since February 2019.  He also has renal insufficiency, with the most recent creatinine being 2.20.  ltrasound examination was performed on 30 May of 2019 of the kidneys.  This showed no evidence of hydronephrosis,, some evidence of parenchymal atrophy of both kidneys and enlargement of the prostate gland with thickening of the bladder wall.  Recently he had some difficulties  With catheterization and a Hollis catheter was placed in the bladder which is been in place now for about one week.  Earlier urodynamic studies have been performed which showed good bladder compliance, bladder capacity of 400 ccbut no appreciable detrusor contraction was elicitedwhile attempting to void..  There is a report that cystoscopy had been performed showing significantenlargement of the prostate and obstruction at the bladder neck.  He has been performing sterile self intermittent catheterization currently 5 times a day, but has significant renal insufficiency the most recent creatinine being 2.3.  He has a pending meeting with our nephrologist to discuss this further.  Urinalysis today showed only a very small amount of leukocyte esterase and a trace of blood in the urine was nitrite negative     Procedure.  Cystoscopy.   Surgeon.    Deangelo  Anesthesia.  Local  anesthesia.  Description.  With the patient in the supine position, with the genital area prepped and draped in the customary fashion, with local anesthetic and the urethra, the flexible cystoscope was Inserted.  The penile urethra is normal.  The external sphincter is intact.  When viewed from verumontanum there is significant enlargement of the lateral lobes of the prostate with the meeting in the midline.  When viewed retrospectively there is a moderate median lobe extending into the bladder.  There is grade 2 trabeculation with quite marked saccule formation seen in the bladder.  There is no evidence of neoplasm or stone in the bladder.  There are no other remarkable features.    Impression.  Following the meeting I met with the patient and his wife in my office to review the situation which is a somewhat complicated one.  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  The cystoscopy today does show significant intravesical obstruction.  In my opinion there are 2 options.  1.  We can continue with self intermittent catheterization.  2.  We could do a transurethral resection of the prostate.    Long-term self intermittent catheterization could be challenging tickly years ago by.  Transurethral resection of the prostate is certainly the goal standard for a situation such as this with evidence of detrusor decompensation and no significant intravesical obstruction and I do not think he would be a candidate for medical treatments such as the use of an alpha-blocker or a 5 alpha reductase inhibitor or for that matter other interventional options including laser TURP.  The issue I cannot guarantee however is his ability to void even if relieve the obstruction because of uncertainty about the integrity of  "the detrusor muscle.  If of course he does not able to void after TURP he could also resume self intermittent catheterization.  I went over TURP discussed the procedure in detail with him today including the potential details of the procedure, side effects complications and expectations  We discussed this in considerable detail today.  We will need to have the opinions of our nephrology colleagues advising us of the best way to protect the upper urinary tract in the long-term.  We decided at the moment that he will continue self intermittent catheterization.  He does have some important eye surgery coming up shortly and when this is resolved we will likely go ahead with a transurethral resection of the prostate.  By then also he was seen a nephrologist for good advice from them to..    Plan.  We will likely need to do a cystoscopy and transurethral resection of the prostate but the patient will let us know when he wants to do that.    Time.  We did have a lengthy discussion today following cystoscopy dislodging 25 minutes covering a large area of what is a very complex situation including a number of different therapeutic options and issues over and above just the obstructive situation by itself.    \"This dictation was performed with voice recognition software and may contain errors,  omissions and inadvertent word substitution.\"      I had a lengthy and careful discussion with the patient and his wife in detail today.  I answered many questions    .         Again, thank you for allowing me to participate in the care of your patient.      Sincerely,    Marc Bright MD      "

## 2019-11-11 DIAGNOSIS — F32.A DEPRESSION, UNSPECIFIED DEPRESSION TYPE: Primary | ICD-10-CM

## 2019-11-11 NOTE — TELEPHONE ENCOUNTER
Verify that the refill encounter hasn't been started Yes    [facility] phone call message- patient requesting a refill:    Full Medication Name: PARoxetine (PAXIL) 30 MG tablet    Dose: Take 1 tablet (30 mg) by mouth every morning - Oral     Pharmacy confirmed as     [id number] - [place], [place] - [address] [facility]  [address] [address]  Phone: [phone] Fax: [phone]  : Yes    Medication tab checked to see if medication has been sent  Yes    Additional Comments: Patient had a msg from pharmacy that a prescription is done. He thinks it is for this medication, but wanted to check with Dr Burr to see if the dose was changed to 20 mg per day, instead of 30 mg per day. Can someone please check to see if a prescription has been sent in for 20 mg?     OK to leave a message on voice mail? Yes    Advised patient refill may take up to 2 business days? Yes    Primary language: English      needed? No    Call taken on November 11, 2019 at 8:38 AM by Keely Thomas to  [facility]

## 2019-11-12 NOTE — TELEPHONE ENCOUNTER
Called patient, unable to get hold of patient, left message to call back the clinic. If patient returns phone call, please notify to contact local pharmacy, regarding prescription message-the source.    Message routed to  as FYI only.    Leslee Payne RN

## 2019-11-14 RX ORDER — PAROXETINE 20 MG/1
20 TABLET, FILM COATED ORAL EVERY MORNING
Qty: 30 TABLET | Refills: 1 | Status: SHIPPED | OUTPATIENT
Start: 2019-11-14 | End: 2020-03-09

## 2019-11-14 NOTE — TELEPHONE ENCOUNTER
"Received transfer call from  and spoke with the patient-reporting per discussion with PCP, \"following up , treatment plan with provider last seen in clinic, to decrease prescription dosage PARoxetine (PAXIL) 30 MG tablet- From 30 Mg to 20 Mg, notified that message will be routed to PCP to address/advise if appropriate, will continue to follow up.    Leslee Payne RN    "

## 2019-11-14 NOTE — TELEPHONE ENCOUNTER
Placed second call to patient to obtain more information, in regards to PARoxetine (PAXIL) 30 MG tablet, unable to get hold of patient, left message to call back the clinic, if patient returns phone call, please transfer to RN.    Leslee Payne RN

## 2019-11-14 NOTE — TELEPHONE ENCOUNTER
New prescription for PARoxetine (PAXIL) 20 MG tablet was sent to pharmacy as requested and recommended on 10/04/2019, Patient notified.    Leslee Payne RN

## 2020-01-17 ENCOUNTER — OFFICE VISIT (OUTPATIENT)
Dept: FAMILY MEDICINE | Facility: CLINIC | Age: 70
End: 2020-01-17
Payer: COMMERCIAL

## 2020-01-17 VITALS
WEIGHT: 164.4 LBS | HEART RATE: 76 BPM | RESPIRATION RATE: 18 BRPM | TEMPERATURE: 97.4 F | DIASTOLIC BLOOD PRESSURE: 64 MMHG | HEIGHT: 70 IN | BODY MASS INDEX: 23.54 KG/M2 | SYSTOLIC BLOOD PRESSURE: 100 MMHG | OXYGEN SATURATION: 97 %

## 2020-01-17 DIAGNOSIS — N40.1 BENIGN PROSTATIC HYPERPLASIA WITH URINARY RETENTION: ICD-10-CM

## 2020-01-17 DIAGNOSIS — N18.30 CHRONIC KIDNEY DISEASE, STAGE 3 (MODERATE): Primary | ICD-10-CM

## 2020-01-17 DIAGNOSIS — N40.1 BENIGN PROSTATIC HYPERPLASIA WITH LOWER URINARY TRACT SYMPTOMS, SYMPTOM DETAILS UNSPECIFIED: ICD-10-CM

## 2020-01-17 DIAGNOSIS — R33.8 BENIGN PROSTATIC HYPERPLASIA WITH URINARY RETENTION: ICD-10-CM

## 2020-01-17 RX ORDER — CHOLECALCIFEROL (VITAMIN D3) 50 MCG
1 TABLET ORAL DAILY
Qty: 90 TABLET | Refills: 1 | Status: SHIPPED | OUTPATIENT
Start: 2020-01-17 | End: 2020-05-12

## 2020-01-17 RX ORDER — DOXAZOSIN 4 MG/1
8 TABLET ORAL AT BEDTIME
Qty: 90 TABLET | Refills: 3 | Status: SHIPPED | OUTPATIENT
Start: 2020-01-17 | End: 2020-01-27

## 2020-01-17 ASSESSMENT — MIFFLIN-ST. JEOR: SCORE: 1519.45

## 2020-01-17 NOTE — PROGRESS NOTES
"       HPI       Mitch Orozco is a 69 year old  who presents for   Chief Complaint   Patient presents with     Follow Up     Medicaiton      Three things with medications he would like to review  1. Moved back in April from [place]. Was taking an alpha blocker there, and was told he was on the highest dose for an adult there. When he came here, he wanted to be on the highest dose - switched back to doxazosin, 8mg. Has had low BP 88/54 and 89/60 at home checks. Felt dizzy when standing up. So he cut down to 3/4 dose at home himself, and feels it has been problematic. Would like to lower to 5mg today.     2. Nephrologist told him he was having increased Ca, and was instructed to take Vit D 2000u started one month ago.      +++++++    Problem, Medication and Allergy Lists were reviewed and updated if needed..    Patient is an established patient of this clinic..         Review of Systems:   Review of Systems         Physical Exam:     Vitals:    01/17/20 0834   BP: 100/64   Pulse: 76   Resp: 18   Temp: 97.4  F (36.3  C)   TempSrc: Oral   SpO2: 97%   Weight: 74.6 kg (164 lb 6.4 oz)   Height: 1.79 m (5' 10.47\")     Body mass index is 23.27 kg/m .  Vitals were reviewed and were normal     Physical Exam  Vitals signs reviewed.   Constitutional:       General: He is not in acute distress.     Appearance: He is well-developed. He is not diaphoretic.   Cardiovascular:      Rate and Rhythm: Normal rate and regular rhythm.      Heart sounds: Normal heart sounds.   Pulmonary:      Effort: Pulmonary effort is normal. No respiratory distress.      Breath sounds: Normal breath sounds.   Psychiatric:         Behavior: Behavior normal.         Thought Content: Thought content normal.         Judgment: Judgment normal.         Results:   No testing ordered today    Assessment and Plan        1. Benign prostatic hyperplasia with lower urinary tract symptoms, symptom details unspecified  Decreased doxazosin to 4mg  - doxazosin " (CARDURA) 4 MG tablet; Take 1 tablet (4 mg) by mouth At Bedtime  Dispense: 90 tablet; Refill: 3    2. Chronic kidney disease, stage 3 (moderate) (H)  Refill given  - vitamin D3 (CHOLECALCIFEROL) 2000 units (50 mcg) tablet; Take 1 tablet (2,000 Units) by mouth daily  Dispense: 90 tablet; Refill: 1    3. Benign prostatic hyperplasia with urinary retention  Refill  - order for DME; Equipment being ordered:   12 [place] intermittent straight catheter red vinyl  Dispense: 180 catheter; Refill: 3       Medications Discontinued During This Encounter   Medication Reason     doxazosin (CARDURA) 8 MG tablet Reorder     order for DME Reorder     doxazosin (CARDURA) 4 MG tablet        Options for treatment and follow-up care were reviewed with the patient. Mitch Orozco  engaged in the decision making process and verbalized understanding of the options discussed and agreed with the final plan.    Kaya Bolton, DO

## 2020-01-27 RX ORDER — DOXAZOSIN 4 MG/1
4 TABLET ORAL AT BEDTIME
Qty: 90 TABLET | Refills: 3 | Status: SHIPPED | OUTPATIENT
Start: 2020-01-27 | End: 2020-05-12

## 2020-03-09 DIAGNOSIS — F32.A DEPRESSION, UNSPECIFIED DEPRESSION TYPE: ICD-10-CM

## 2020-03-09 RX ORDER — PAROXETINE 20 MG/1
20 TABLET, FILM COATED ORAL EVERY MORNING
Qty: 30 TABLET | Refills: 0 | Status: SHIPPED | OUTPATIENT
Start: 2020-03-09 | End: 2020-03-10

## 2020-03-09 NOTE — TELEPHONE ENCOUNTER
RN sent emergency temp supply per protocol for 30 days. Message routed to PCP to send further refills if appropriate    Renee Lynn RN

## 2020-03-09 NOTE — TELEPHONE ENCOUNTER
Verify that the refill encounter hasn't been started Yes    [facility] phone call message- patient requesting a refill:    Full Medication Name: PARoxetine (PAXIL) 20 MG tablet     Dose: Take 1 tablet (20 mg) by mouth every morning - Oral      Pharmacy confirmed as   [id number] - [facility] [address]  [address] [address]  Phone: [phone] Fax: [phone]  : Yes    Medication tab checked to see if medication has been sent  Yes    Additional Comments: Patient is out of medication.     OK to leave a message on voice mail? Yes    Advised patient refill may take up to 2 business days? Yes    Primary language: English      needed? No    Call taken on March 9, 2020 at 12:23 PM by Rosio Thomas to [facility]

## 2020-03-10 RX ORDER — PAROXETINE 20 MG/1
20 TABLET, FILM COATED ORAL EVERY MORNING
Qty: 30 TABLET | Refills: 0 | Status: SHIPPED | OUTPATIENT
Start: 2020-03-10 | End: 2020-05-12

## 2020-03-12 DIAGNOSIS — N40.1 BENIGN PROSTATIC HYPERPLASIA WITH LOWER URINARY TRACT SYMPTOMS, SYMPTOM DETAILS UNSPECIFIED: ICD-10-CM

## 2020-03-12 RX ORDER — FINASTERIDE 5 MG/1
5 TABLET, FILM COATED ORAL DAILY
Qty: 90 TABLET | Refills: 2 | Status: SHIPPED | OUTPATIENT
Start: 2020-03-12 | End: 2020-05-12

## 2020-04-14 ENCOUNTER — DOCUMENTATION ONLY (OUTPATIENT)
Dept: FAMILY MEDICINE | Facility: CLINIC | Age: 70
End: 2020-04-14

## 2020-04-14 NOTE — PROGRESS NOTES
"When opening a documentation only encounter, be sure to enter in \"Chief Complaint\" Forms and in \" Comments\" Title of form, description if needed.    Laurent is a 69 year old  male  Form received via: Fax  Form now resides in: Provider Ready    Nemo Guo CMA                  "

## 2020-04-16 NOTE — PROGRESS NOTES
Form has been completed by provider.     Form sent out via: Fax to [employer] at Fax Number: [phone]  Patient informed: n/a  Output date: April 16, 2020    Ming Cordoba MA      **Please close the encounter**

## 2020-05-12 ENCOUNTER — VIRTUAL VISIT (OUTPATIENT)
Dept: FAMILY MEDICINE | Facility: CLINIC | Age: 70
End: 2020-05-12
Payer: COMMERCIAL

## 2020-05-12 DIAGNOSIS — F32.A DEPRESSION, UNSPECIFIED DEPRESSION TYPE: Primary | ICD-10-CM

## 2020-05-12 DIAGNOSIS — Z86.19 H/O VIRAL ILLNESS: ICD-10-CM

## 2020-05-12 DIAGNOSIS — N18.30 CHRONIC KIDNEY DISEASE, STAGE 3 (MODERATE): ICD-10-CM

## 2020-05-12 DIAGNOSIS — N40.1 BENIGN PROSTATIC HYPERPLASIA WITH LOWER URINARY TRACT SYMPTOMS, SYMPTOM DETAILS UNSPECIFIED: ICD-10-CM

## 2020-05-12 RX ORDER — CHOLECALCIFEROL (VITAMIN D3) 50 MCG
1 TABLET ORAL DAILY
Qty: 90 TABLET | Refills: 3 | Status: SHIPPED | OUTPATIENT
Start: 2020-05-12

## 2020-05-12 RX ORDER — DOXAZOSIN 4 MG/1
4 TABLET ORAL AT BEDTIME
Qty: 90 TABLET | Refills: 3 | Status: SHIPPED | OUTPATIENT
Start: 2020-05-12

## 2020-05-12 RX ORDER — PAROXETINE 20 MG/1
20 TABLET, FILM COATED ORAL EVERY MORNING
Qty: 90 TABLET | Refills: 3 | Status: SHIPPED | OUTPATIENT
Start: 2020-05-12 | End: 2021-05-02

## 2020-05-12 RX ORDER — FINASTERIDE 5 MG/1
5 TABLET, FILM COATED ORAL DAILY
Qty: 90 TABLET | Refills: 3 | Status: SHIPPED | OUTPATIENT
Start: 2020-05-12

## 2020-05-12 ASSESSMENT — PATIENT HEALTH QUESTIONNAIRE - PHQ9: SUM OF ALL RESPONSES TO PHQ QUESTIONS 1-9: 1

## 2020-05-12 NOTE — PROGRESS NOTES
Telephone Preceptor Attestation:   Patient spoken with and discussed with the resident. I have verified the content of the note, which accurately reflects my assessment of the patient and the plan of care.   Supervising Physician:  Soraya Serrano MD

## 2020-05-12 NOTE — PATIENT INSTRUCTIONS
Here is the plan from today's visit    1. Depression, unspecified depression type  Refills given for 1 year, ok to wait 1 year for follow-up  Recommend following up for routine annual physical exam after COVID19   - PARoxetine (PAXIL) 20 MG tablet; Take 1 tablet (20 mg) by mouth every morning  Dispense: 90 tablet; Refill: 3    2. H/O viral illness  Someone will call you to ask questions, and if approved will schedule you for lab draw  If you come in for a lab draw, please let me know via [facility] and I will order labs for your kidney function  - COVID-19 Virus (Coronavirus) Antibody; Future    3. Benign prostatic hyperplasia with lower urinary tract symptoms, symptom details unspecified  Refills given for 1 year  - doxazosin (CARDURA) 4 MG tablet; Take 1 tablet (4 mg) by mouth At Bedtime  Dispense: 90 tablet; Refill: 3  - finasteride (PROSCAR) 5 MG tablet; Take 1 tablet (5 mg) by mouth daily  Dispense: 90 tablet; Refill: 3    4. Chronic kidney disease, stage 3 (moderate) (H)  Please call nephrologist to schedule follow-up  Refills given for 1 year  - vitamin D3 (CHOLECALCIFEROL) 2000 units (50 mcg) tablet; Take 1 tablet (2,000 Units) by mouth daily  Dispense: 90 tablet; Refill: 3    Please call or return to clinic if your symptoms don't go away.    Follow up plan  Please make a clinic appointment for follow up with your primary physician in 1 year for routine annual follow-up, as long as you are seen by Nephrology with labs within the next 3 months.    Thank you for coming to [facility] today.  Lab Testing:  **If you had lab testing today and your results are reassuring or normal they will be mailed to you or sent through [employer] within 7 days.   **If the lab tests need quick action we will call you with the results.  The phone number we will call with results is # [phone] (home) . If this is not the best number please call our clinic and change the number.  Medication Refills:  If you need any refills  please call your pharmacy and they will contact us.   If you need to  your refill at a new pharmacy, please contact the new pharmacy directly. The new pharmacy will help you get your medications transferred faster.   Scheduling:  If you have any concerns about today's visit or wish to schedule another appointment please call our office during normal business hours [phone] (8-5:00 M-F)  If a referral was made to a [facility] and you don't get a call from central scheduling please call [phone].  If a Mammogram was ordered for you at [facility] call [phone] to schedule or change your appointment.  If you had an XRay/CT/Ultrasound/MRI ordered the number is [phone] to schedule or change your radiology appointment.   Medical Concerns:  If you have urgent medical concerns please call [phone] at any time of the day.    Kaya Bolton, DO

## 2020-05-12 NOTE — PROGRESS NOTES
"Family Medicine Telephone Visit Note         Telephone Visit Consent   Patient was verbally read the following and verbal consent was obtained.    \"Telephone visits are billed at different rates depending on your insurance coverage. During this emergency period, for some insurers they may be billed the same as an in-person visit.  Please reach out to your insurance provider with any questions.  If during the course of the call the physician/provider feels a telephone visit is not appropriate, you will not be charged for this service.\"    Name person giving consent:  Patient   Date verbal consent given:  5/12/2020  Time verbal consent given:  12:58 PM     Chief Complaint   Patient presents with     Medication Request     Pt request refilled for Paroxetine and would like a 90 day quantity, Also pt would like dr pool gaspar and insight on a antibody test for any patietns that were exposed to covid, pt also wants to know if he can get a refill on finasteride (PROSCAR) 5 MG tablet .          HPI   Patients name: Bill  Appointment start time:  1:07 PM    COVID check in: patient describes a peculiar combination of boredom and fear. He is self isolating with wife, daughter, and grand daughter. He has been very safe     Med Refill:  - Patient just wanted  - Patient only has 3 tabs left, and when asking pharmacy he had no refills left of Paxil  - Doxasin 4mg at bedtime  - Went from 30mg to 20mg Paxil, given concern for kidney function  - No suicidal thoughts, all well-controlled and no concerns     CKD, last visit with Nephro (11/26/19)  Plan from nephrologist during that visit  # Recheck CKD labs  # Drink at least 6-8 glasses of water daily  # Avoid NSAIDs  # 2 grams of Na and K restricted diet  # RTC in 3 months or sooner as needed    Early March had a viral illness. Originally did not think it was COVID19 at that time, because he did not have a fever. Patient now wondering if he had it, since the data is showing so many " "patients who asymptomatic.   - persistent cough  - trouble with breathing for a few hours  - wife had a bad sore throat  - granddaughter and daughter had fever with decreased appetite    PHQ 10/4/2019 5/12/2020   PHQ-9 Total Score 0 1   Q9: Thoughts of better off dead/self-harm past 2 weeks Not at all Not at all     Current Outpatient Medications   Medication Sig Dispense Refill     doxazosin (CARDURA) 4 MG tablet Take 1 tablet (4 mg) by mouth At Bedtime 90 tablet 3     finasteride (PROSCAR) 5 MG tablet Take 1 tablet (5 mg) by mouth daily 90 tablet 2     order for DME Equipment being ordered:     12 [place]ese intermittent straight catheter red vinyl 180 catheter 3     PARoxetine (PAXIL) 20 MG tablet Take 1 tablet (20 mg) by mouth every morning 30 tablet 0     vitamin D3 (CHOLECALCIFEROL) 2000 units (50 mcg) tablet Take 1 tablet (2,000 Units) by mouth daily 90 tablet 1     No Known Allergies         Review of Systems:     Constitutional, HEENT, cardiovascular, pulmonary, gi and gu systems are negative, except as otherwise noted.         Physical Exam:     There were no vitals taken for this visit.  Estimated body mass index is 23.27 kg/m  as calculated from the following:    Height as of 1/17/20: 1.79 m (5' 10.47\").    Weight as of 1/17/20: 74.6 kg (164 lb 6.4 oz).    Physical exam not done due to virtual visit over the telephone.        Assessment and Plan   1. Depression, unspecified depression type  Patient with stable PHQ9, no concerns, plan to continue with 20mg daily. 90 daysupply x 3 refills given for 1 year. Patient should check in with annual physical exam when COVID19 restrictions allow, however if things are stable, may return in 1 year for follow-up and refill sthen.  - PARoxetine (PAXIL) 20 MG tablet; Take 1 tablet (20 mg) by mouth every morning  Dispense: 90 tablet; Refill: 3    2. H/O viral illness  Patient with history of viral symptoms in March, and would like antibody testing - which is appropriate " given history. Order placed, and discussed with patient about plan (will be contacted for further questions, and if approved will be scheduled for lab only visit)  - COVID-19 Virus (Coronavirus) Antibody; Future    3. Benign prostatic hyperplasia with lower urinary tract symptoms, symptom details unspecified  Refill given  - doxazosin (CARDURA) 4 MG tablet; Take 1 tablet (4 mg) by mouth At Bedtime  Dispense: 90 tablet; Refill: 3  - finasteride (PROSCAR) 5 MG tablet; Take 1 tablet (5 mg) by mouth daily  Dispense: 90 tablet; Refill: 3    4. Chronic kidney disease, stage 3 (moderate) (H)  Patient will be contacted for COVID antibody testing per above, and if approved - patient will reach out to me via [employer] to let me know, so we can order CKD labs to be drawn at the same time, in preparation for follow-up with Nephrologist. Encouraged patient to contact nephrology clinic and ask if he should be seen via video/telephone with labs, or ok to wait a few months for follow-up.   - vitamin D3 (CHOLECALCIFEROL) 2000 units (50 mcg) tablet; Take 1 tablet (2,000 Units) by mouth daily  Dispense: 90 tablet; Refill: 3    Refilled medications that would be required in the next 3 months.     After Visit Information:  Patient chose to view AVS via [employer]    No follow-ups on file.    Appointment end time: 1:24 PM  This is a telephone visit that took 17 minutes.      Clinician location:  Richard Bolton, DO  I precepted today with Dr. Serrano, who spoke to patient over the phone.

## 2020-05-13 ENCOUNTER — OFFICE VISIT (OUTPATIENT)
Dept: LAB | Facility: CLINIC | Age: 70
End: 2020-05-13
Attending: FAMILY MEDICINE
Payer: MEDICARE

## 2020-05-13 DIAGNOSIS — Z86.19 H/O VIRAL ILLNESS: ICD-10-CM

## 2020-05-19 LAB
COVID-19 SPIKE RBD ABY TITER: NORMAL
COVID-19 SPIKE RBD ABY: NEGATIVE

## 2020-07-21 ENCOUNTER — DOCUMENTATION ONLY (OUTPATIENT)
Dept: FAMILY MEDICINE | Facility: CLINIC | Age: 70
End: 2020-07-21

## 2020-07-21 NOTE — PROGRESS NOTES
"When opening a documentation only encounter, be sure to enter in \"Chief Complaint\" Forms and in \" Comments\" Title of form, description if needed.    Laurent is a 69 year old  male  Form received via: Fax  Form now resides in: Provider CHEMA Tesfaye 5:16 PM July 21, 2020                    "

## 2020-07-29 NOTE — PROGRESS NOTES
Form has been completed by provider.     Form sent out via: Fax to [employer] at Fax Number: [phone]  Patient informed: n/a  Output date: July 29, 2020    Ming Cordoba MA      **Please close the encounter**

## 2020-08-12 ENCOUNTER — DOCUMENTATION ONLY (OUTPATIENT)
Dept: FAMILY MEDICINE | Facility: CLINIC | Age: 70
End: 2020-08-12

## 2020-08-12 NOTE — PROGRESS NOTES
"When opening a documentation only encounter, be sure to enter in \"Chief Complaint\" Forms and in \" Comments\" Title of form, description if needed.    Laurent is a 70 year old  male  Form received via: Fax  Form now resides in: Provider Ready    Tayler Sadler MA                  "

## 2020-08-21 NOTE — PROGRESS NOTES
Form has been completed by provider.     Form sent out via: Fax to [employer] at Fax Number: [phone]  Patient informed: N/A  Output date: August 21, 2020    Ketty Pompa CMA      **Please close the encounter**

## 2020-11-10 ENCOUNTER — DOCUMENTATION ONLY (OUTPATIENT)
Dept: FAMILY MEDICINE | Facility: CLINIC | Age: 70
End: 2020-11-10

## 2020-11-12 NOTE — PROGRESS NOTES
Form has been completed by provider.     Form sent out via: Fax to [employer] at Fax Number: [phone]  Patient informed: No, Reason:  Output date: November 12, 2020    lEsy Nicholson MA      **Please close the encounter**

## 2021-01-10 ENCOUNTER — HEALTH MAINTENANCE LETTER (OUTPATIENT)
Age: 71
End: 2021-01-10

## 2021-04-29 DIAGNOSIS — F32.A DEPRESSION, UNSPECIFIED DEPRESSION TYPE: ICD-10-CM

## 2021-04-30 NOTE — TELEPHONE ENCOUNTER
"Patient's pharmacy requesting refill of paroxetine 20 mg. Last office visit 5/12/20 with Dr. Bolton. Routing to provider to approve if appropriate.   Lise Monroe RN      Request for medication refill:    Providers if patient needs an appointment and you are willing to give a one month supply please refill for one month and  send a letter/MyChart using \".SMILLIMITEDREFILL\" .smillimited and route chart to \"P [facility] \" (Giving one month refill in non controlled medications is strongly recommended before denial)    If refill has been denied, meaning absolutely no refills without visit, please complete the smart phrase \".smirxrefuse\" and route it to the \"P [facility] MED REFILLS\"  pool to inform the patient and the pharmacy.          "

## 2021-05-02 RX ORDER — PAROXETINE 20 MG/1
TABLET, FILM COATED ORAL
Qty: 90 TABLET | Refills: 1 | Status: SHIPPED | OUTPATIENT
Start: 2021-05-02 | End: 2021-09-22

## 2021-05-05 ENCOUNTER — DOCUMENTATION ONLY (OUTPATIENT)
Dept: FAMILY MEDICINE | Facility: CLINIC | Age: 71
End: 2021-05-05

## 2021-05-05 NOTE — PROGRESS NOTES
"When opening a documentation only encounter, be sure to enter in \"Chief Complaint\" Forms and in \" Comments\" Title of form, description if needed.    Laurent is a 70 year old  male  Form received via: Fax  Form now resides in: Provider Ready    Tayler Sadler MA     Form has been completed by provider.     Form sent out via: Fax to [facility] [employer] [facility] at Fax Number: [phone]  Patient informed: No, Reason:n/a  Output date: May 6, 2021    Tayler Sadler MA      **Please close the encounter**                      "

## 2021-06-01 DIAGNOSIS — N40.1 BENIGN PROSTATIC HYPERPLASIA WITH LOWER URINARY TRACT SYMPTOMS, SYMPTOM DETAILS UNSPECIFIED: ICD-10-CM

## 2021-06-04 RX ORDER — FINASTERIDE 5 MG/1
TABLET, FILM COATED ORAL
Qty: 90 TABLET | Refills: 2 | OUTPATIENT
Start: 2021-06-04

## 2021-09-22 DIAGNOSIS — F32.A DEPRESSION, UNSPECIFIED DEPRESSION TYPE: ICD-10-CM

## 2021-09-22 RX ORDER — PAROXETINE 20 MG/1
20 TABLET, FILM COATED ORAL EVERY MORNING
Qty: 90 TABLET | Refills: 1 | Status: SHIPPED | OUTPATIENT
Start: 2021-09-22 | End: 2022-04-15

## 2021-09-22 NOTE — TELEPHONE ENCOUNTER
"Request for medication refill:  PARoxetine (PAXIL) 20 MG tablet    Providers if patient needs an appointment and you are willing to give a one month supply please refill for one month and  send a letter/MyChart using \".SMILLIMITEDREFILL\" .smillimited and route chart to \"P [facility] \" (Giving one month refill in non controlled medications is strongly recommended before denial)    If refill has been denied, meaning absolutely no refills without visit, please complete the smart phrase \".smirxrefuse\" and route it to the \"P [facility] MED REFILLS\"  pool to inform the patient and the pharmacy.    Sumi Lozada        "

## 2021-10-23 ENCOUNTER — HEALTH MAINTENANCE LETTER (OUTPATIENT)
Age: 71
End: 2021-10-23

## 2022-02-12 ENCOUNTER — HEALTH MAINTENANCE LETTER (OUTPATIENT)
Age: 72
End: 2022-02-12

## 2022-04-15 DIAGNOSIS — F32.A DEPRESSION, UNSPECIFIED DEPRESSION TYPE: ICD-10-CM

## 2022-04-15 RX ORDER — PAROXETINE 20 MG/1
20 TABLET, FILM COATED ORAL EVERY MORNING
Qty: 90 TABLET | Refills: 1 | Status: SHIPPED | OUTPATIENT
Start: 2022-04-15

## 2022-04-15 NOTE — TELEPHONE ENCOUNTER
"Request for medication refill:  Paroxetine hcl 20mg tablet    Providers if patient needs an appointment and you are willing to give a one month supply please refill for one month and  send a letter/MyChart using \".SMILLIMITEDREFILL\" .smillimited and route chart to \"P [facility] \" (Giving one month refill in non controlled medications is strongly recommended before denial)    If refill has been denied, meaning absolutely no refills without visit, please complete the smart phrase \".smirxrefuse\" and route it to the \"P [facility] MED REFILLS\"  pool to inform the patient and the pharmacy.    Tayler Perry        "

## 2022-04-19 NOTE — TELEPHONE ENCOUNTER
I called patient to schedule with Dr. Randall after medication refill. Patient said that he is [place] now and that he will get his medications out in [place] from now on.     Janiya Garg

## 2022-10-09 ENCOUNTER — HEALTH MAINTENANCE LETTER (OUTPATIENT)
Age: 72
End: 2022-10-09

## 2023-02-18 ENCOUNTER — HEALTH MAINTENANCE LETTER (OUTPATIENT)
Age: 73
End: 2023-02-18

## 2024-03-16 ENCOUNTER — HEALTH MAINTENANCE LETTER (OUTPATIENT)
Age: 74
End: 2024-03-16

## (undated) RX ORDER — SULFAMETHOXAZOLE/TRIMETHOPRIM 800-160 MG
TABLET ORAL
Status: DISPENSED
Start: 2019-08-19